# Patient Record
Sex: FEMALE | Race: WHITE | NOT HISPANIC OR LATINO | Employment: OTHER | URBAN - METROPOLITAN AREA
[De-identification: names, ages, dates, MRNs, and addresses within clinical notes are randomized per-mention and may not be internally consistent; named-entity substitution may affect disease eponyms.]

---

## 2017-05-10 ENCOUNTER — APPOINTMENT (OUTPATIENT)
Dept: LAB | Facility: CLINIC | Age: 37
End: 2017-05-10
Payer: COMMERCIAL

## 2017-05-10 ENCOUNTER — TRANSCRIBE ORDERS (OUTPATIENT)
Dept: MRI IMAGING | Facility: CLINIC | Age: 37
End: 2017-05-10

## 2017-05-10 ENCOUNTER — ALLSCRIPTS OFFICE VISIT (OUTPATIENT)
Dept: OTHER | Facility: OTHER | Age: 37
End: 2017-05-10

## 2017-05-10 DIAGNOSIS — J31.0 CHRONIC RHINITIS: ICD-10-CM

## 2017-05-10 DIAGNOSIS — D24.1 BENIGN NEOPLASM OF RIGHT BREAST: ICD-10-CM

## 2017-05-10 PROCEDURE — 87070 CULTURE OTHR SPECIMN AEROBIC: CPT

## 2017-05-13 LAB — BACTERIA THROAT CULT: NORMAL

## 2017-06-16 ENCOUNTER — GENERIC CONVERSION - ENCOUNTER (OUTPATIENT)
Dept: OTHER | Facility: OTHER | Age: 37
End: 2017-06-16

## 2018-01-13 VITALS
HEART RATE: 60 BPM | BODY MASS INDEX: 23.24 KG/M2 | TEMPERATURE: 97.5 F | WEIGHT: 136.13 LBS | DIASTOLIC BLOOD PRESSURE: 78 MMHG | OXYGEN SATURATION: 97 % | SYSTOLIC BLOOD PRESSURE: 104 MMHG | HEIGHT: 64 IN

## 2018-03-22 RX ORDER — VALACYCLOVIR HYDROCHLORIDE 500 MG/1
TABLET, FILM COATED ORAL
Refills: 3 | COMMUNITY
Start: 2018-02-01

## 2018-03-24 ENCOUNTER — OFFICE VISIT (OUTPATIENT)
Dept: INTERNAL MEDICINE CLINIC | Facility: CLINIC | Age: 38
End: 2018-03-24
Payer: COMMERCIAL

## 2018-03-24 VITALS
OXYGEN SATURATION: 98 % | BODY MASS INDEX: 22.88 KG/M2 | WEIGHT: 134 LBS | HEART RATE: 70 BPM | DIASTOLIC BLOOD PRESSURE: 72 MMHG | HEIGHT: 64 IN | SYSTOLIC BLOOD PRESSURE: 100 MMHG

## 2018-03-24 DIAGNOSIS — F41.9 ANXIETY AND DEPRESSION: Primary | ICD-10-CM

## 2018-03-24 DIAGNOSIS — G25.81 RESTLESS LEG: ICD-10-CM

## 2018-03-24 DIAGNOSIS — F32.A ANXIETY AND DEPRESSION: Primary | ICD-10-CM

## 2018-03-24 PROCEDURE — 99214 OFFICE O/P EST MOD 30 MIN: CPT | Performed by: INTERNAL MEDICINE

## 2018-03-24 RX ORDER — PRAMIPEXOLE DIHYDROCHLORIDE 0.25 MG/1
TABLET ORAL
Qty: 30 TABLET | Refills: 5 | Status: SHIPPED | OUTPATIENT
Start: 2018-03-24 | End: 2018-09-16 | Stop reason: SDUPTHER

## 2018-03-24 NOTE — PROGRESS NOTES
Assessment/Plan:       Diagnoses and all orders for this visit:    Anxiety and depression    Restless leg    Other orders  -     valACYclovir (VALTREX) 500 mg tablet; TAKE 1 TABLET (500 MG TOTAL) BY MOUTH 2 (TWO) TIMES A DAY FOR 3 DAYS  Subjective:      Patient ID: Chinmay Dyer is a 30 y o  female  A 74-year-old woman  Complaint of restless leg  This has been intermittent over the years and was significant in college  It disappeared and now it has recurred over the past year  She experiences a need to move her legs she has to get out of bed she has to walk she has to go up and down steps  Associated anxiety symptoms  Especially in I, the patient will experience a sensation of rapid heartbeat, heaviness in the chest, palpitations, and worry  Context is that she is a young mother with 2 small children  1 child is 12month-old  She is listening to the baby monitor waiting for something to happen   Associated low libido-no sex drive at all  No physical complaints regarding sexuality  [place] does not hurt  She recently saw gynecology and has no abnormalities  No relationship issues  Denies abuse  No substance issues  Nonsmoker, no illicit drugs, rare alcohol  Objectively she states that she realizes her life is good  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  There were no financial issues  There are no relationship issues  She is not a victim of abuse  Anxiety   Symptoms include nervous/anxious behavior  Patient reports no dizziness, nausea or shortness of breath  The following portions of the patient's history were reviewed and updated as appropriate:   She has a past medical history of Hair or hair follicle disorder  ,   does not have any pertinent problems on file  ,   has a past surgical history that includes Tooth extraction  ,  family history includes Breast cancer in her family; Colon cancer in her maternal grandfather; Diverticulitis in her father; Ovarian cancer in her other; Ulcerative colitis in her mother  ,   reports that she has never smoked  She has never used smokeless tobacco  She reports that she does not drink alcohol or use drugs  ,  has No Known Allergies     Current Outpatient Prescriptions   Medication Sig Dispense Refill    valACYclovir (VALTREX) 500 mg tablet TAKE 1 TABLET (500 MG TOTAL) BY MOUTH 2 (TWO) TIMES A DAY FOR 3 DAYS  3     No current facility-administered medications for this visit  Review of Systems   Constitutional: Negative for chills and fever  HENT: Negative for sore throat and trouble swallowing  Eyes: Negative for pain  Respiratory: Negative for cough, shortness of breath and wheezing  Gastrointestinal: Negative for abdominal pain, diarrhea, nausea and vomiting  Endocrine: Negative for cold intolerance and heat intolerance  Genitourinary: Negative for dysuria, frequency and pelvic pain  Musculoskeletal: Negative for arthralgias and joint swelling  Skin: Negative for rash and wound  Allergic/Immunologic: Negative for immunocompromised state  Neurological: Positive for tremors  Negative for dizziness, seizures, syncope and headaches  Psychiatric/Behavioral: Positive for dysphoric mood and sleep disturbance  The patient is nervous/anxious  Objective:  Vitals:    03/24/18 1034   BP: 100/72   Pulse: 70   SpO2: 98%      Physical Exam   Constitutional: She is oriented to person, place, and time  She appears well-developed and well-nourished  HENT:   Head: Normocephalic and atraumatic  Eyes: EOM are normal  Pupils are equal, round, and reactive to light  Neck: Normal range of motion  Neck supple  No tracheal deviation present  No thyromegaly present  Cardiovascular: Normal rate, regular rhythm and normal heart sounds  Exam reveals no gallop  No murmur heard  Pulmonary/Chest: No respiratory distress  She has no wheezes  She has no rales     Musculoskeletal: Normal range of motion  She exhibits no tenderness or deformity  Neurological: She is alert and oriented to person, place, and time  Coordination normal    Skin: Skin is warm  Psychiatric: She has a normal mood and affect   Judgment normal

## 2018-03-24 NOTE — PATIENT INSTRUCTIONS
Restless leg with associated insomnia  Recommendation is trial of Mirapex starting at 0   2 5 HS with escalation of dose up to 2 mg as needed  Panic disorder/anxiety/depression  I would prefer to stick with Mirapex and see what happens to the other issues in relationship to sleep disorder

## 2018-08-24 ENCOUNTER — OFFICE VISIT (OUTPATIENT)
Dept: INTERNAL MEDICINE CLINIC | Facility: CLINIC | Age: 38
End: 2018-08-24
Payer: COMMERCIAL

## 2018-08-24 VITALS
SYSTOLIC BLOOD PRESSURE: 110 MMHG | OXYGEN SATURATION: 94 % | WEIGHT: 134.2 LBS | BODY MASS INDEX: 22.91 KG/M2 | HEIGHT: 64 IN | DIASTOLIC BLOOD PRESSURE: 70 MMHG | HEART RATE: 81 BPM

## 2018-08-24 DIAGNOSIS — F32.A ANXIETY AND DEPRESSION: Primary | ICD-10-CM

## 2018-08-24 DIAGNOSIS — F41.9 ANXIETY AND DEPRESSION: Primary | ICD-10-CM

## 2018-08-24 PROCEDURE — 99213 OFFICE O/P EST LOW 20 MIN: CPT | Performed by: INTERNAL MEDICINE

## 2018-08-24 NOTE — PATIENT INSTRUCTIONS
The patient gives a good history of prolonged depression  It has been ongoing since her last child was born about 2 years ago  I do recommend consult again with therapy  Follow up with me in about 6 weeks  Recommendation is therapeutic trial of Zoloft    Started 25 mg daily and try to workup, albeit slowly, to 100

## 2018-08-24 NOTE — PROGRESS NOTES
Assessment/Plan:       Diagnoses and all orders for this visit:    Anxiety and depression          There are no Patient Instructions on file for this visit  Subjective:      Patient ID: Josefina Red is a 41 y o  female  A 41year-old patient presents with a 2 year history of symptoms of depression  She scores 18 on the depression scale     She reports that she has been to therapy in the past although not recently  At the advice of a gynecologist, she has taken upper regime of multivitamins and vitamin B12 and vitamin D3 which has, of the enough, cured night sweats, but she is still depressed   She has taken up bicycling, goes to the gym, she has no physical limitations  She asks if she would benefit from an antidepressant medication  There are no obvious precipitants  There are no life events that 1 might blame  Relationship is good  Children are well  Physical health is good  No financial woesDenies suicidal or homicidal ideation      She does state that she, occasionally, gets a bit of anxiety, but although she has a mixed anxiety depression picture predominant depression is noted  No associated fatigue        The following portions of the patient's history were reviewed and updated as appropriate:   She has a past medical history of Hair or hair follicle disorder  ,   does not have any pertinent problems on file  ,   has a past surgical history that includes Tooth extraction  ,  family history includes Breast cancer in her family; Colon cancer in her maternal grandfather; Diverticulitis in her father; Ovarian cancer in her other; Ulcerative colitis in her mother  ,   reports that she has never smoked  She has never used smokeless tobacco  She reports that she does not drink alcohol or use drugs  ,  has No Known Allergies     Current Outpatient Prescriptions   Medication Sig Dispense Refill    pramipexole (MIRAPEX) 0 25 mg tablet 1 tablet HS   30 tablet 5    valACYclovir (VALTREX) 500 mg tablet TAKE 1 TABLET (500 MG TOTAL) BY MOUTH 2 (TWO) TIMES A DAY FOR 3 DAYS  3     No current facility-administered medications for this visit  Review of Systems   Constitutional: Negative for chills and fever  HENT: Negative for sore throat and trouble swallowing  Eyes: Negative for pain  Respiratory: Negative for cough, shortness of breath and wheezing  Cardiovascular: Negative for chest pain and leg swelling  Gastrointestinal: Negative for abdominal pain, diarrhea, nausea and vomiting  Endocrine: Negative for cold intolerance and heat intolerance  Genitourinary: Negative for dysuria, frequency and pelvic pain  Musculoskeletal: Negative for arthralgias and joint swelling  Skin: Negative for rash and wound  Allergic/Immunologic: Negative for immunocompromised state  Neurological: Negative for dizziness, seizures, syncope and headaches  Psychiatric/Behavioral: Positive for dysphoric mood  The patient is not nervous/anxious  Objective:  Vitals:    08/24/18 1139   BP: 110/70   Pulse: 81   SpO2: 94%      Physical Exam   Constitutional: She appears well-developed and well-nourished  Pulmonary/Chest: Effort normal  No respiratory distress  Musculoskeletal: Normal range of motion  Neurological: She is alert  Psychiatric: She exhibits a depressed mood

## 2018-09-16 DIAGNOSIS — G25.81 RESTLESS LEG: ICD-10-CM

## 2018-09-17 RX ORDER — PRAMIPEXOLE DIHYDROCHLORIDE 0.25 MG/1
TABLET ORAL
Qty: 30 TABLET | Refills: 1 | Status: SHIPPED | OUTPATIENT
Start: 2018-09-17 | End: 2018-09-21 | Stop reason: SDUPTHER

## 2018-09-21 DIAGNOSIS — G25.81 RESTLESS LEG: ICD-10-CM

## 2018-09-21 RX ORDER — PRAMIPEXOLE DIHYDROCHLORIDE 0.25 MG/1
0.25 TABLET ORAL
Qty: 90 TABLET | Refills: 1 | Status: SHIPPED | OUTPATIENT
Start: 2018-09-21 | End: 2019-05-11 | Stop reason: SDUPTHER

## 2018-09-21 NOTE — TELEPHONE ENCOUNTER
Patient called stating that [facility] said she had no more refills on pramipexole (Mirapex) 0 25 mg tablet  Please check and see if pharmacy needs a new script for the medication and if doctor can send in a refill

## 2018-09-24 ENCOUNTER — HOSPITAL ENCOUNTER (OUTPATIENT)
Dept: CT IMAGING | Facility: HOSPITAL | Age: 38
Discharge: HOME | End: 2018-09-24
Attending: INTERNAL MEDICINE
Payer: COMMERCIAL

## 2018-09-24 ENCOUNTER — OFFICE VISIT (OUTPATIENT)
Dept: INTERNAL MEDICINE CLINIC | Facility: CLINIC | Age: 38
End: 2018-09-24
Payer: COMMERCIAL

## 2018-09-24 ENCOUNTER — TELEPHONE (OUTPATIENT)
Dept: INTERNAL MEDICINE CLINIC | Facility: CLINIC | Age: 38
End: 2018-09-24

## 2018-09-24 VITALS
BODY MASS INDEX: 23.05 KG/M2 | RESPIRATION RATE: 14 BRPM | HEIGHT: 64 IN | DIASTOLIC BLOOD PRESSURE: 78 MMHG | WEIGHT: 135 LBS | HEART RATE: 78 BPM | OXYGEN SATURATION: 97 % | SYSTOLIC BLOOD PRESSURE: 110 MMHG

## 2018-09-24 DIAGNOSIS — F41.9 ANXIETY AND DEPRESSION: ICD-10-CM

## 2018-09-24 DIAGNOSIS — R61 NIGHT SWEATS: ICD-10-CM

## 2018-09-24 DIAGNOSIS — G44.52 NEW DAILY PERSISTENT HEADACHE: ICD-10-CM

## 2018-09-24 DIAGNOSIS — F32.A ANXIETY AND DEPRESSION: ICD-10-CM

## 2018-09-24 DIAGNOSIS — G44.52 NEW DAILY PERSISTENT HEADACHE: Primary | ICD-10-CM

## 2018-09-24 PROCEDURE — 3008F BODY MASS INDEX DOCD: CPT | Performed by: INTERNAL MEDICINE

## 2018-09-24 PROCEDURE — 99214 OFFICE O/P EST MOD 30 MIN: CPT | Performed by: INTERNAL MEDICINE

## 2018-09-24 PROCEDURE — 1036F TOBACCO NON-USER: CPT | Performed by: INTERNAL MEDICINE

## 2018-09-24 PROCEDURE — 70496 CT ANGIOGRAPHY HEAD: CPT

## 2018-09-24 RX ADMIN — IOHEXOL 85 ML: 350 INJECTION, SOLUTION INTRAVENOUS at 18:28

## 2018-09-24 NOTE — PATIENT INSTRUCTIONS
A 41year-old with well controlled depression but new onset of left temporal headache of unknown cause  She will need a stat CTA to exclude the admittedly remote possibility of aneurysm  The patient is caring for small children and cannot wait here   She will go home and await our phone call

## 2018-09-24 NOTE — PROGRESS NOTES
Assessment/Plan:       Diagnoses and all orders for this visit:    New daily persistent headache    Anxiety and depression    Night sweats          There are no Patient Instructions on file for this visit  Subjective:      Patient ID: Yamileth Cleary is a 41 y o  female  Follow-up visit for depression and some other new issues    A patient had verbalize severe depressive symptoms  Begun on Zoloft and titrated up to 50 mg  She returns today stating that she feels well and that this is working quite nicely  Headache:  New onset of headache felt in the left temporal region  Description is of a pulsating headache  Onset was 3-4 days ago  Radiation is up to the top of the head  1-2 episodes a day lasting 3-4 minutes  No precipitant 70 no precipitating factors been identified  No lateralizing symptoms but some lightheadedness  One-week onset of night sweats beginning suddenly with no known exacerbating factor  No infectious disease symptoms  She has had these in the past and there was successfully treated with a combination of vitamin-D, vitamin-B, and vitamin C  The following portions of the patient's history were reviewed and updated as appropriate:   She has a past medical history of Hair or hair follicle disorder  ,   does not have any pertinent problems on file  ,   has a past surgical history that includes Tooth extraction  ,  family history includes Breast cancer in her family; Colon cancer in her maternal grandfather; Diverticulitis in her father; Ovarian cancer in her other; Ulcerative colitis in her mother  ,   reports that she has never smoked  She has never used smokeless tobacco  She reports that she does not drink alcohol or use drugs  ,  has No Known Allergies     Current Outpatient Prescriptions   Medication Sig Dispense Refill    pramipexole (MIRAPEX) 0 25 mg tablet Take 1 tablet (0 25 mg total) by mouth daily at bedtime 90 tablet 1    sertraline (ZOLOFT) 50 mg tablet Take 1 tablet (50 mg total) by mouth daily 30 tablet 3    valACYclovir (VALTREX) 500 mg tablet TAKE 1 TABLET (500 MG TOTAL) BY MOUTH 2 (TWO) TIMES A DAY FOR 3 DAYS  3     No current facility-administered medications for this visit  Review of Systems   Constitutional: Positive for diaphoresis  Negative for activity change and fatigue  Neurological: Positive for light-headedness and headaches  Negative for tremors, syncope, weakness and numbness  Psychiatric/Behavioral: Negative for dysphoric mood  The patient is not nervous/anxious  Objective:  Vitals:    09/24/18 1039   BP: 110/78   Pulse: 78   Resp: 14   SpO2: 97%      Physical Exam   Constitutional: She appears well-developed and well-nourished  Pulmonary/Chest: Effort normal  No respiratory distress  Musculoskeletal: Normal range of motion  Neurological: She is alert  Psychiatric: Thought content normal  Her mood appears not anxious  Cognition and memory are normal  She does not exhibit a depressed mood

## 2018-09-25 ENCOUNTER — TELEPHONE (OUTPATIENT)
Dept: INTERNAL MEDICINE CLINIC | Facility: CLINIC | Age: 38
End: 2018-09-25

## 2018-09-25 NOTE — TELEPHONE ENCOUNTER
Pt called back in regards to the RX she has none left  Was in yesterday to see Mario Alberto and there were other issues and forgot about the RX  She did call earlier today  Just following up and  also [facility] [address]

## 2018-09-25 NOTE — TELEPHONE ENCOUNTER
Called pt and informed her of result per Dr Brian Sheffield, pt would like to know if Dr Vazquez Listen be increasing her Zoloft rx? Pt was under the impression that she would be getting a new rx with an increased dosage, FWD to Dr Brian Sheffield

## 2018-10-25 ENCOUNTER — TELEPHONE (OUTPATIENT)
Dept: INTERNAL MEDICINE CLINIC | Facility: CLINIC | Age: 38
End: 2018-10-25

## 2018-10-25 DIAGNOSIS — F41.9 ANXIETY AND DEPRESSION: ICD-10-CM

## 2018-10-25 DIAGNOSIS — F32.A ANXIETY AND DEPRESSION: ICD-10-CM

## 2018-10-25 RX ORDER — SERTRALINE HYDROCHLORIDE 100 MG/1
100 TABLET, FILM COATED ORAL DAILY
Qty: 90 TABLET | Refills: 3 | Status: SHIPPED | OUTPATIENT
Start: 2018-10-25 | End: 2019-11-28 | Stop reason: SDUPTHER

## 2018-10-25 NOTE — TELEPHONE ENCOUNTER
Pt has been taking zoloft 50mg for the past 2 months  Feeling like she is back to where she was, maybe her body is getting used to it    Would like to either increase medication or change medications, please advise

## 2018-12-27 ENCOUNTER — OFFICE VISIT (OUTPATIENT)
Dept: INTERNAL MEDICINE CLINIC | Facility: CLINIC | Age: 38
End: 2018-12-27
Payer: COMMERCIAL

## 2018-12-27 ENCOUNTER — APPOINTMENT (OUTPATIENT)
Dept: LAB | Facility: CLINIC | Age: 38
End: 2018-12-27
Payer: COMMERCIAL

## 2018-12-27 VITALS
DIASTOLIC BLOOD PRESSURE: 80 MMHG | BODY MASS INDEX: 21.28 KG/M2 | OXYGEN SATURATION: 97 % | SYSTOLIC BLOOD PRESSURE: 130 MMHG | HEART RATE: 78 BPM | HEIGHT: 66 IN | WEIGHT: 132.4 LBS

## 2018-12-27 DIAGNOSIS — Z11.1 TUBERCULOSIS SCREENING: ICD-10-CM

## 2018-12-27 DIAGNOSIS — Z00.00 HEALTH CARE MAINTENANCE: ICD-10-CM

## 2018-12-27 DIAGNOSIS — Z00.00 HEALTH CARE MAINTENANCE: Primary | ICD-10-CM

## 2018-12-27 DIAGNOSIS — T75.3XXA SEA SICKNESS, INITIAL ENCOUNTER: Primary | ICD-10-CM

## 2018-12-27 PROBLEM — G44.52 NEW DAILY PERSISTENT HEADACHE: Status: RESOLVED | Noted: 2018-09-24 | Resolved: 2018-12-27

## 2018-12-27 PROBLEM — G25.81 RESTLESS LEG: Status: RESOLVED | Noted: 2018-03-24 | Resolved: 2018-12-27

## 2018-12-27 PROBLEM — R61 NIGHT SWEATS: Status: RESOLVED | Noted: 2018-09-24 | Resolved: 2018-12-27

## 2018-12-27 PROCEDURE — 99395 PREV VISIT EST AGE 18-39: CPT | Performed by: INTERNAL MEDICINE

## 2018-12-27 PROCEDURE — 86480 TB TEST CELL IMMUN MEASURE: CPT

## 2018-12-27 PROCEDURE — 36415 COLL VENOUS BLD VENIPUNCTURE: CPT

## 2018-12-27 RX ORDER — SCOLOPAMINE TRANSDERMAL SYSTEM 1 MG/1
1 PATCH, EXTENDED RELEASE TRANSDERMAL
Qty: 3 PATCH | Refills: 0 | Status: SHIPPED | OUTPATIENT
Start: 2018-12-27 | End: 2019-02-06

## 2018-12-27 NOTE — PROGRESS NOTES
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   Treated for anxious depression with Zoloft 100 which is working well  Completely asymptomatic  The following portions of the patient's history were reviewed and updated as appropriate:   She has a past medical history of Hair or hair follicle disorder  ,   does not have any pertinent problems on file  ,   has a past surgical history that includes Tooth extraction  ,  family history includes Breast cancer in her family; Colon cancer in her maternal grandfather; Diverticulitis in her father; Ovarian cancer in her other; Ulcerative colitis in her mother  ,   reports that she has never smoked  She has never used smokeless tobacco  She reports that she does not drink alcohol or use drugs  ,  has No Known Allergies     Current Outpatient Prescriptions   Medication Sig Dispense Refill    pramipexole (MIRAPEX) 0 25 mg tablet Take 1 tablet (0 25 mg total) by mouth daily at bedtime 90 tablet 1    sertraline (ZOLOFT) 100 mg tablet Take 1 tablet (100 mg total) by mouth daily 90 tablet 3    valACYclovir (VALTREX) 500 mg tablet TAKE 1 TABLET (500 MG TOTAL) BY MOUTH 2 (TWO) TIMES A DAY FOR 3 DAYS  3     No current facility-administered medications for this visit  Review of Systems   Constitutional: Negative for chills and fever  HENT: Negative for sore throat and trouble swallowing  Eyes: Negative for pain  Respiratory: Negative for cough, shortness of breath and wheezing  Cardiovascular: Negative for chest pain and leg swelling  Gastrointestinal: Negative for abdominal pain, diarrhea, nausea and vomiting  Endocrine: Negative for cold intolerance and heat intolerance  Genitourinary: Negative for dysuria, frequency and pelvic pain  Musculoskeletal: Negative for arthralgias and joint swelling  Skin: Negative for rash and wound  Allergic/Immunologic: Negative for immunocompromised state  Neurological: Negative for dizziness, seizures, syncope and headaches  Psychiatric/Behavioral: Negative for dysphoric mood  The patient is not nervous/anxious  Objective:  Vitals:    12/27/18 0908   BP: 130/80   Pulse: 78   SpO2: 97%      Physical Exam   Constitutional: She is oriented to person, place, and time  She appears well-developed and well-nourished  HENT:   Head: Normocephalic and atraumatic  Eyes: Pupils are equal, round, and reactive to light  EOM are normal    Neck: Normal range of motion  Neck supple  No tracheal deviation present  No thyromegaly present  Cardiovascular: Normal rate, regular rhythm and normal heart sounds  Exam reveals no gallop  No murmur heard  Pulmonary/Chest: No respiratory distress  She has no wheezes  She has no rales  Abdominal: Soft  Bowel sounds are normal  There is no tenderness  Musculoskeletal: Normal range of motion  She exhibits no tenderness or deformity  Neurological: She is alert and oriented to person, place, and time  Coordination normal    Skin: Skin is warm  Psychiatric: She has a normal mood and affect   Judgment normal

## 2018-12-28 LAB
GAMMA INTERFERON BACKGROUND BLD IA-ACNC: 0.05 IU/ML
M TB IFN-G BLD-IMP: NEGATIVE
M TB IFN-G CD4+ BCKGRND COR BLD-ACNC: 0 IU/ML
M TB IFN-G CD4+ BCKGRND COR BLD-ACNC: 0.01 IU/ML
MITOGEN IGNF BCKGRD COR BLD-ACNC: >10 IU/ML

## 2019-01-02 ENCOUNTER — TELEPHONE (OUTPATIENT)
Dept: INTERNAL MEDICINE CLINIC | Facility: CLINIC | Age: 39
End: 2019-01-02

## 2019-01-02 NOTE — TELEPHONE ENCOUNTER
Pt dropped off school physical form, needs to be filled out  Pt would like to  today if at all possible, call back upon completion, put physical form on robbie's desk

## 2019-01-03 ENCOUNTER — TELEPHONE (OUTPATIENT)
Dept: INTERNAL MEDICINE CLINIC | Facility: CLINIC | Age: 39
End: 2019-01-03

## 2019-01-03 NOTE — TELEPHONE ENCOUNTER
PATIENT CALLED REQUESTING THE PHYSICAL FORMS THAT SHE DROPPED OFF YESTERDAY  SHE NEEDS THEM TO BE FILLED OUT ASAP  SHE CANT START HER NEW JOB WITHOUT THEM

## 2019-01-07 ENCOUNTER — TELEPHONE (OUTPATIENT)
Dept: INTERNAL MEDICINE CLINIC | Facility: CLINIC | Age: 39
End: 2019-01-07

## 2019-01-07 NOTE — TELEPHONE ENCOUNTER
Patient called- She was in for a physical with Dr Nicolas Regan on 12/27 and the backside of her form wasn't filled out  Her employer needs it today or she will not be allowed to continue to work  She's going to come by today and drop it off and wait for it  She doesn't have any other choice- unfortunately        #[phone]

## 2019-02-06 ENCOUNTER — OFFICE VISIT (OUTPATIENT)
Dept: INTERNAL MEDICINE CLINIC | Facility: CLINIC | Age: 39
End: 2019-02-06
Payer: COMMERCIAL

## 2019-02-06 ENCOUNTER — OFFICE VISIT (OUTPATIENT)
Dept: GASTROENTEROLOGY | Facility: CLINIC | Age: 39
End: 2019-02-06
Payer: COMMERCIAL

## 2019-02-06 VITALS
SYSTOLIC BLOOD PRESSURE: 102 MMHG | OXYGEN SATURATION: 98 % | HEART RATE: 70 BPM | DIASTOLIC BLOOD PRESSURE: 72 MMHG | BODY MASS INDEX: 21.63 KG/M2 | WEIGHT: 132 LBS

## 2019-02-06 VITALS
DIASTOLIC BLOOD PRESSURE: 70 MMHG | HEIGHT: 66 IN | BODY MASS INDEX: 21.05 KG/M2 | WEIGHT: 131 LBS | SYSTOLIC BLOOD PRESSURE: 115 MMHG | HEART RATE: 75 BPM

## 2019-02-06 DIAGNOSIS — R10.84 GENERALIZED ABDOMINAL PAIN: ICD-10-CM

## 2019-02-06 DIAGNOSIS — K76.9 HEPATIC LESION: Primary | ICD-10-CM

## 2019-02-06 DIAGNOSIS — R14.0 BLOATING: Primary | ICD-10-CM

## 2019-02-06 DIAGNOSIS — K58.8 OTHER IRRITABLE BOWEL SYNDROME: ICD-10-CM

## 2019-02-06 PROCEDURE — 1036F TOBACCO NON-USER: CPT | Performed by: INTERNAL MEDICINE

## 2019-02-06 PROCEDURE — 99214 OFFICE O/P EST MOD 30 MIN: CPT | Performed by: NURSE PRACTITIONER

## 2019-02-06 PROCEDURE — 99213 OFFICE O/P EST LOW 20 MIN: CPT | Performed by: INTERNAL MEDICINE

## 2019-02-06 RX ORDER — DICYCLOMINE HCL 20 MG
20 TABLET ORAL EVERY 6 HOURS
Qty: 120 TABLET | Refills: 6 | Status: SHIPPED | OUTPATIENT
Start: 2019-02-06 | End: 2019-03-08

## 2019-02-06 NOTE — PROGRESS NOTES
Assessment/Plan:    IBS with mild abdominal pain and bloating- trial of dicyclomine, continue with diet and probiotics  Appendectomy on 1/25- saw surgeon doing well       Diagnoses and all orders for this visit:    Bloating  -     dicyclomine (BENTYL) 20 mg tablet; Take 1 tablet (20 mg total) by mouth every 6 (six) hours for 30 days    Other irritable bowel syndrome  -     dicyclomine (BENTYL) 20 mg tablet; Take 1 tablet (20 mg total) by mouth every 6 (six) hours for 30 days    Generalized abdominal pain  -     dicyclomine (BENTYL) 20 mg tablet; Take 1 tablet (20 mg total) by mouth every 6 (six) hours for 30 days            Subjective:      Patient ID: Emile Darling is a 41 y o  female  73-year-old female with history of two colonoscopies that were completely normal and diagnosis of IBS with constipation  Presently she is having two bowel movements a day without melena or hematochezia no rectal pain  She has a good appetite and overall feels well except for persistent abdominal bloating and abdominal pain relieved by gas  She had appendectomy on January 25th and is doing well surgically  She has never tried dicyclomine but she does take probiotics, exercises, has a healthy diet  We will give her a trial of dicyclomine and she will call in one month        The following portions of the patient's history were reviewed and updated as appropriate: She  has a past medical history of Hair or hair follicle disorder  She   Patient Active Problem List    Diagnosis Date Noted    Other irritable bowel syndrome 02/06/2019    Bloating 02/06/2019    Generalized abdominal pain 02/06/2019    Health care maintenance 12/27/2018    Tuberculosis screening 12/27/2018    Anxiety and depression 03/24/2018     She  has a past surgical history that includes Tooth extraction    Her family history includes Breast cancer in her family; Colon cancer in her maternal grandfather; Diverticulitis in her father; Ovarian cancer in her other; Ulcerative colitis in her mother  She  reports that she has never smoked  She has never used smokeless tobacco  She reports that she does not drink alcohol or use drugs  Current Outpatient Prescriptions   Medication Sig Dispense Refill    pramipexole (MIRAPEX) 0 25 mg tablet Take 1 tablet (0 25 mg total) by mouth daily at bedtime 90 tablet 1    sertraline (ZOLOFT) 100 mg tablet Take 1 tablet (100 mg total) by mouth daily 90 tablet 3    valACYclovir (VALTREX) 500 mg tablet TAKE 1 TABLET (500 MG TOTAL) BY MOUTH 2 (TWO) TIMES A DAY FOR 3 DAYS  3    dicyclomine (BENTYL) 20 mg tablet Take 1 tablet (20 mg total) by mouth every 6 (six) hours for 30 days 120 tablet 6     No current facility-administered medications for this visit  Current Outpatient Prescriptions on File Prior to Visit   Medication Sig    pramipexole (MIRAPEX) 0 25 mg tablet Take 1 tablet (0 25 mg total) by mouth daily at bedtime    sertraline (ZOLOFT) 100 mg tablet Take 1 tablet (100 mg total) by mouth daily    valACYclovir (VALTREX) 500 mg tablet TAKE 1 TABLET (500 MG TOTAL) BY MOUTH 2 (TWO) TIMES A DAY FOR 3 DAYS   [DISCONTINUED] scopolamine (TRANSDERM-SCOP) 1 5 mg/3 days TD 72 hr patch Place 1 patch on the skin every third day     No current facility-administered medications on file prior to visit  She has No Known Allergies       Review of Systems   Constitutional: Negative for appetite change, fatigue and unexpected weight change  HENT: Negative  Respiratory: Negative  Cardiovascular: Negative  Gastrointestinal: Positive for abdominal distention and abdominal pain  Skin: Negative  Psychiatric/Behavioral: Negative  Objective:      /70   Pulse 75   Ht 5' 5 5" (1 664 m)   Wt 59 4 kg (131 lb)   BMI 21 47 kg/m²          Physical Exam   Constitutional: She appears well-developed and well-nourished  Cardiovascular: Normal rate and regular rhythm      Pulmonary/Chest: Effort normal and breath sounds normal    Abdominal: Soft  Bowel sounds are normal  There is no tenderness

## 2019-02-08 NOTE — PATIENT INSTRUCTIONS
Incidental finding of some kind of hepatic lesion on CT needs follow-up MRI  The importance of this emphasized the patient  She agreed to this plan

## 2019-02-08 NOTE — PROGRESS NOTES
Assessment/Plan:       Diagnoses and all orders for this visit:    Hepatic lesion  -     MRI abdomen w wo contrast; Future          Patient Instructions   Incidental finding of some kind of hepatic lesion on CT needs follow-up MRI  The importance of this emphasized the patient  She agreed to this plan  Subjective:      Patient ID: Emerson Lobo is a 41 y o  female  Brought in for a RANJIT visit after an appendectomy  She feels well  However, I reviewed the data from the hospitalization and noted that a CT scan reported some kind of lesion in the upper lobe of the liver and recommended an MRI  The patient was not informed of this during the hospitalization  No symptoms referable to this finding        The following portions of the patient's history were reviewed and updated as appropriate:   She has a past medical history of Hair or hair follicle disorder  ,   does not have any pertinent problems on file  ,   has a past surgical history that includes Tooth extraction and Appendectomy  ,  family history includes Breast cancer in her family; Colon cancer in her maternal grandfather; Diverticulitis in her father; Ovarian cancer in her other; Ulcerative colitis in her mother  ,   reports that she has never smoked  She has never used smokeless tobacco  She reports that she does not drink alcohol or use drugs  ,  has No Known Allergies     Current Outpatient Prescriptions   Medication Sig Dispense Refill    dicyclomine (BENTYL) 20 mg tablet Take 1 tablet (20 mg total) by mouth every 6 (six) hours for 30 days 120 tablet 6    pramipexole (MIRAPEX) 0 25 mg tablet Take 1 tablet (0 25 mg total) by mouth daily at bedtime 90 tablet 1    sertraline (ZOLOFT) 100 mg tablet Take 1 tablet (100 mg total) by mouth daily 90 tablet 3    valACYclovir (VALTREX) 500 mg tablet TAKE 1 TABLET (500 MG TOTAL) BY MOUTH 2 (TWO) TIMES A DAY FOR 3 DAYS  3     No current facility-administered medications for this visit          Review of Systems   Constitutional: Negative for chills and fever  HENT: Negative for sore throat and trouble swallowing  Eyes: Negative for pain  Respiratory: Negative for cough, shortness of breath and wheezing  Cardiovascular: Negative for chest pain and leg swelling  Gastrointestinal: Negative for abdominal pain, diarrhea, nausea and vomiting  Endocrine: Negative for cold intolerance and heat intolerance  Genitourinary: Negative for dysuria, frequency and pelvic pain  Musculoskeletal: Negative for arthralgias and joint swelling  Skin: Negative for rash and wound  Allergic/Immunologic: Negative for immunocompromised state  Neurological: Negative for dizziness, seizures, syncope and headaches  Psychiatric/Behavioral: Negative for dysphoric mood  The patient is not nervous/anxious  Objective:  Vitals:    02/06/19 1801   BP: 102/72   Pulse: 70   SpO2: 98%      Physical Exam   Constitutional:   A female patient who looks well   Pulmonary/Chest: Effort normal    Abdominal: Soft  Neurological: She is alert  Skin:   Healed surgical scars   Psychiatric: She has a normal mood and affect   Judgment normal

## 2019-03-06 ENCOUNTER — TRANSCRIBE ORDERS (OUTPATIENT)
Dept: RADIOLOGY | Facility: CLINIC | Age: 39
End: 2019-03-06

## 2019-03-08 ENCOUNTER — HOSPITAL ENCOUNTER (OUTPATIENT)
Dept: MRI IMAGING | Facility: CLINIC | Age: 39
Discharge: HOME | End: 2019-03-08
Payer: COMMERCIAL

## 2019-03-08 DIAGNOSIS — K76.9 HEPATIC LESION: ICD-10-CM

## 2019-03-08 PROCEDURE — 74183 MRI ABD W/O CNTR FLWD CNTR: CPT

## 2019-03-08 PROCEDURE — 76377 3D RENDER W/INTRP POSTPROCES: CPT

## 2019-03-08 PROCEDURE — A9585 GADOBUTROL INJECTION: HCPCS | Performed by: INTERNAL MEDICINE

## 2019-03-08 RX ADMIN — GADOBUTROL 6 ML: 604.72 INJECTION INTRAVENOUS at 10:56

## 2019-03-12 ENCOUNTER — TELEPHONE (OUTPATIENT)
Dept: INTERNAL MEDICINE CLINIC | Facility: CLINIC | Age: 39
End: 2019-03-12

## 2019-03-12 NOTE — TELEPHONE ENCOUNTER
----- Message from Tawanda Flood MD sent at 3/12/2019  7:58 AM EDT -----  Please call the patient regarding her MRI  Normal MRI  2 hemangiomas in the liver    These are tangled groups of blood vessels and benign

## 2019-05-11 DIAGNOSIS — G25.81 RESTLESS LEG: ICD-10-CM

## 2019-05-13 RX ORDER — PRAMIPEXOLE DIHYDROCHLORIDE 0.25 MG/1
0.25 TABLET ORAL
Qty: 90 TABLET | Refills: 1 | Status: SHIPPED | OUTPATIENT
Start: 2019-05-13 | End: 2019-11-10 | Stop reason: SDUPTHER

## 2019-09-12 ENCOUNTER — TELEPHONE (OUTPATIENT)
Dept: INTERNAL MEDICINE CLINIC | Facility: CLINIC | Age: 39
End: 2019-09-12

## 2019-09-12 DIAGNOSIS — Z00.00 HEALTHCARE MAINTENANCE: Primary | ICD-10-CM

## 2019-09-12 NOTE — TELEPHONE ENCOUNTER
Pt called wants to get a TB shot for work  Pt does not have any paperwork to bring in  Pt was Last seen 02/06 with Dr Sonya Montero for a TCM appt        PT Skyler Rolon

## 2019-09-18 ENCOUNTER — CLINICAL SUPPORT (OUTPATIENT)
Dept: INTERNAL MEDICINE CLINIC | Facility: CLINIC | Age: 39
End: 2019-09-18
Payer: COMMERCIAL

## 2019-09-18 DIAGNOSIS — Z23 ENCOUNTER FOR IMMUNIZATION: Primary | ICD-10-CM

## 2019-09-18 PROCEDURE — 90686 IIV4 VACC NO PRSV 0.5 ML IM: CPT

## 2019-09-18 PROCEDURE — 90471 IMMUNIZATION ADMIN: CPT

## 2019-09-18 PROCEDURE — 86580 TB INTRADERMAL TEST: CPT

## 2019-09-20 LAB
INDURATION: 0 MM
TB SKIN TEST: NEGATIVE

## 2019-11-10 DIAGNOSIS — G25.81 RESTLESS LEG: ICD-10-CM

## 2019-11-11 RX ORDER — PRAMIPEXOLE DIHYDROCHLORIDE 0.25 MG/1
0.25 TABLET ORAL
Qty: 90 TABLET | Refills: 1 | Status: SHIPPED | OUTPATIENT
Start: 2019-11-11 | End: 2020-05-06 | Stop reason: SDUPTHER

## 2019-11-28 DIAGNOSIS — F32.A ANXIETY AND DEPRESSION: ICD-10-CM

## 2019-11-28 DIAGNOSIS — F41.9 ANXIETY AND DEPRESSION: ICD-10-CM

## 2019-11-29 RX ORDER — SERTRALINE HYDROCHLORIDE 100 MG/1
TABLET, FILM COATED ORAL
Qty: 90 TABLET | Refills: 3 | Status: SHIPPED | OUTPATIENT
Start: 2019-11-29 | End: 2021-02-03

## 2020-05-05 DIAGNOSIS — G25.81 RESTLESS LEG: ICD-10-CM

## 2020-05-05 RX ORDER — PRAMIPEXOLE DIHYDROCHLORIDE 0.25 MG/1
0.25 TABLET ORAL
Qty: 90 TABLET | Refills: 1 | OUTPATIENT
Start: 2020-05-05

## 2020-05-06 DIAGNOSIS — G25.81 RESTLESS LEG: ICD-10-CM

## 2020-05-06 RX ORDER — PRAMIPEXOLE DIHYDROCHLORIDE 0.25 MG/1
0.25 TABLET ORAL
Qty: 90 TABLET | Refills: 1 | Status: SHIPPED | OUTPATIENT
Start: 2020-05-06 | End: 2020-11-09

## 2020-05-18 DIAGNOSIS — F32.A ANXIETY AND DEPRESSION: Primary | ICD-10-CM

## 2020-05-18 DIAGNOSIS — F41.9 ANXIETY AND DEPRESSION: Primary | ICD-10-CM

## 2020-05-18 RX ORDER — BUPROPION HYDROCHLORIDE 75 MG/1
75 TABLET ORAL 2 TIMES DAILY
Qty: 100 TABLET | Refills: 3 | Status: SHIPPED | OUTPATIENT
Start: 2020-05-18 | End: 2021-05-03

## 2020-07-21 ENCOUNTER — TELEPHONE (OUTPATIENT)
Dept: ADMINISTRATIVE | Facility: OTHER | Age: 40
End: 2020-07-21

## 2020-07-21 ENCOUNTER — TELEPHONE (OUTPATIENT)
Dept: OTHER | Facility: OTHER | Age: 40
End: 2020-07-21

## 2020-07-21 NOTE — TELEPHONE ENCOUNTER
----- Message from Vikash Logan sent at 7/21/2020  1:57 PM EDT -----  Regarding: pap  Contact: [phone]  07/21/20 1:58 PM    Hello, our patient Chad Bansal has had Pap Smear (HPV) aka Cervical Cancer Screening completed/performed  Please assist in updating the patient chart by pulling the document from Lab Tab within Chart Review  The date of service is 1/9/2020       Thank you,  LADONNA Logan 66

## 2020-07-22 NOTE — TELEPHONE ENCOUNTER
7/22/20 9:00 AM 30 Freddy Mai MD [[id number]] MED AS MON [[id number]] [facility] PHYSICAL     Patient called to cancel appointment and will call the office to reschedule

## 2020-11-09 DIAGNOSIS — G25.81 RESTLESS LEG: ICD-10-CM

## 2020-11-09 RX ORDER — PRAMIPEXOLE DIHYDROCHLORIDE 0.25 MG/1
TABLET ORAL
Qty: 90 TABLET | Refills: 1 | Status: SHIPPED | OUTPATIENT
Start: 2020-11-09 | End: 2021-05-15

## 2021-02-02 DIAGNOSIS — F32.A ANXIETY AND DEPRESSION: ICD-10-CM

## 2021-02-02 DIAGNOSIS — F41.9 ANXIETY AND DEPRESSION: ICD-10-CM

## 2021-02-03 RX ORDER — SERTRALINE HYDROCHLORIDE 100 MG/1
TABLET, FILM COATED ORAL
Qty: 90 TABLET | Refills: 3 | Status: SHIPPED | OUTPATIENT
Start: 2021-02-03

## 2021-04-13 DIAGNOSIS — Z23 ENCOUNTER FOR IMMUNIZATION: ICD-10-CM

## 2021-05-03 ENCOUNTER — OFFICE VISIT (OUTPATIENT)
Dept: INTERNAL MEDICINE CLINIC | Facility: CLINIC | Age: 41
End: 2021-05-03
Payer: COMMERCIAL

## 2021-05-03 VITALS
WEIGHT: 143 LBS | BODY MASS INDEX: 23.8 KG/M2 | OXYGEN SATURATION: 98 % | SYSTOLIC BLOOD PRESSURE: 112 MMHG | DIASTOLIC BLOOD PRESSURE: 70 MMHG | TEMPERATURE: 97.7 F | HEART RATE: 65 BPM

## 2021-05-03 DIAGNOSIS — R09.82 POST-NASAL DRAINAGE: ICD-10-CM

## 2021-05-03 DIAGNOSIS — R44.8 OTHER SYMPTOMS AND SIGNS INVOLVING GENERAL SENSATIONS AND PERCEPTIONS: ICD-10-CM

## 2021-05-03 DIAGNOSIS — R09.89 GLOBUS SENSATION: Primary | ICD-10-CM

## 2021-05-03 PROBLEM — R09.A2 GLOBUS SENSATION: Status: ACTIVE | Noted: 2021-05-03

## 2021-05-03 PROCEDURE — 3725F SCREEN DEPRESSION PERFORMED: CPT | Performed by: INTERNAL MEDICINE

## 2021-05-03 PROCEDURE — 99214 OFFICE O/P EST MOD 30 MIN: CPT | Performed by: INTERNAL MEDICINE

## 2021-05-03 PROCEDURE — 1036F TOBACCO NON-USER: CPT | Performed by: INTERNAL MEDICINE

## 2021-05-03 NOTE — PROGRESS NOTES
Assessment/Plan:       Diagnoses and all orders for this visit:    Globus sensation  -     US thyroid; Future  -     XR sinuses routine 3+ views; Future  -     Ambulatory Referral to Otolaryngology; Future    Post-nasal drainage  -     US thyroid; Future  -     XR sinuses routine 3+ views; Future  -     Ambulatory Referral to Otolaryngology; Future                Subjective:      Patient ID: Hannah Barrett is a 47 y o  female  72-year-old with globus sensation  Although this is been only been going on for about 1 week, she has had this kind of stuffy nose and scratchy throat thing going on for the past 4-5 months  So, the most likely diagnosis is chronic allergic postnasal drip  Exam is benign   Will send her for x-ray of sinuses, thyroid ultrasound, and ENT consultation  The following portions of the patient's history were reviewed and updated as appropriate:   She has a past medical history of Hair or hair follicle disorder  ,  does not have any pertinent problems on file  ,   has a past surgical history that includes Tooth extraction and Appendectomy  ,  family history includes Breast cancer in her family; Colon cancer in her maternal grandfather; Diverticulitis in her father; Ovarian cancer in her other; Ulcerative colitis in her mother  ,   reports that she has never smoked  She has never used smokeless tobacco  She reports that she does not drink alcohol or use drugs  ,  has No Known Allergies     Current Outpatient Medications   Medication Sig Dispense Refill    pramipexole (MIRAPEX) 0 25 mg tablet TAKE 1 TABLET BY MOUTH DAILY AT BEDTIME 90 tablet 1    sertraline (ZOLOFT) 100 mg tablet TAKE 1 TABLET BY MOUTH EVERY DAY 90 tablet 3    valACYclovir (VALTREX) 500 mg tablet TAKE 1 TABLET (500 MG TOTAL) BY MOUTH 2 (TWO) TIMES A DAY FOR 3 DAYS    3    dicyclomine (BENTYL) 20 mg tablet Take 1 tablet (20 mg total) by mouth every 6 (six) hours for 30 days 120 tablet 6     No current facility-administered medications for this visit  Review of Systems   HENT: Positive for congestion  Globus sensation without dysphagia and without odynophagia   All other systems reviewed and are negative  Objective:  Vitals:    05/03/21 1626   BP: 112/70   Pulse: 65   Temp: 97 7 °F (36 5 °C)   SpO2: 98%      Physical Exam  Constitutional:       Appearance: Normal appearance  HENT:      Mouth/Throat:      Mouth: Mucous membranes are moist       Pharynx: Oropharynx is clear  No oropharyngeal exudate or posterior oropharyngeal erythema  Neck:      Musculoskeletal: Normal range of motion  Cardiovascular:      Rate and Rhythm: Normal rate and regular rhythm  Pulmonary:      Effort: Pulmonary effort is normal       Breath sounds: Normal breath sounds  Lymphadenopathy:      Cervical: No cervical adenopathy  Neurological:      Mental Status: She is alert  There are no Patient Instructions on file for this visit

## 2021-05-04 ENCOUNTER — HOSPITAL ENCOUNTER (OUTPATIENT)
Dept: ULTRASOUND IMAGING | Facility: CLINIC | Age: 41
Discharge: HOME | End: 2021-05-04
Payer: COMMERCIAL

## 2021-05-04 DIAGNOSIS — R09.82 POST-NASAL DRAINAGE: ICD-10-CM

## 2021-05-04 DIAGNOSIS — R09.89 GLOBUS SENSATION: ICD-10-CM

## 2021-05-04 PROCEDURE — 76536 US EXAM OF HEAD AND NECK: CPT

## 2021-05-07 ENCOUNTER — TELEPHONE (OUTPATIENT)
Dept: INTERNAL MEDICINE CLINIC | Facility: CLINIC | Age: 41
End: 2021-05-07

## 2021-05-15 DIAGNOSIS — G25.81 RESTLESS LEG: ICD-10-CM

## 2021-05-15 RX ORDER — PRAMIPEXOLE DIHYDROCHLORIDE 0.25 MG/1
TABLET ORAL
Qty: 90 TABLET | Refills: 1 | Status: SHIPPED | OUTPATIENT
Start: 2021-05-15 | End: 2021-08-18 | Stop reason: SDUPTHER

## 2021-05-18 DIAGNOSIS — Z00.00 HEALTHCARE MAINTENANCE: Primary | ICD-10-CM

## 2021-06-01 ENCOUNTER — TELEPHONE (OUTPATIENT)
Dept: INTERNAL MEDICINE CLINIC | Facility: CLINIC | Age: 41
End: 2021-06-01

## 2021-06-01 NOTE — TELEPHONE ENCOUNTER
Going away at the end of the wk, needs a motion sickness patch    Call back # [phone]    [facility] [address] # [phone]

## 2021-06-02 DIAGNOSIS — T75.3XXA MOTION SICKNESS, INITIAL ENCOUNTER: Primary | ICD-10-CM

## 2021-06-02 PROCEDURE — U0005 INFEC AGEN DETEC AMPLI PROBE: HCPCS | Performed by: INTERNAL MEDICINE

## 2021-06-02 PROCEDURE — U0003 INFECTIOUS AGENT DETECTION BY NUCLEIC ACID (DNA OR RNA); SEVERE ACUTE RESPIRATORY SYNDROME CORONAVIRUS 2 (SARS-COV-2) (CORONAVIRUS DISEASE [COVID-19]), AMPLIFIED PROBE TECHNIQUE, MAKING USE OF HIGH THROUGHPUT TECHNOLOGIES AS DESCRIBED BY CMS-2020-01-R: HCPCS | Performed by: INTERNAL MEDICINE

## 2021-06-02 RX ORDER — SCOLOPAMINE TRANSDERMAL SYSTEM 1 MG/1
1 PATCH, EXTENDED RELEASE TRANSDERMAL
Qty: 7 PATCH | Refills: 0 | Status: SHIPPED | OUTPATIENT
Start: 2021-06-02

## 2021-06-03 LAB — SARS-COV-2 RNA RESP QL NAA+PROBE: NEGATIVE

## 2021-08-18 DIAGNOSIS — G25.81 RESTLESS LEG: ICD-10-CM

## 2021-08-18 RX ORDER — PRAMIPEXOLE DIHYDROCHLORIDE 0.25 MG/1
0.25 TABLET ORAL
Qty: 90 TABLET | Refills: 0 | Status: SHIPPED | OUTPATIENT
Start: 2021-08-18 | End: 2022-02-06

## 2021-12-14 ENCOUNTER — IMMUNIZATIONS (OUTPATIENT)
Dept: FAMILY MEDICINE CLINIC | Facility: HOSPITAL | Age: 41
End: 2021-12-14

## 2021-12-14 DIAGNOSIS — Z23 ENCOUNTER FOR IMMUNIZATION: Primary | ICD-10-CM

## 2021-12-14 PROCEDURE — 91300 COVID-19 PFIZER VACC 0.3 ML: CPT

## 2021-12-14 PROCEDURE — 0001A COVID-19 PFIZER VACC 0.3 ML: CPT

## 2022-02-06 DIAGNOSIS — G25.81 RESTLESS LEG: ICD-10-CM

## 2022-02-06 RX ORDER — PRAMIPEXOLE DIHYDROCHLORIDE 0.25 MG/1
0.25 TABLET ORAL
Qty: 90 TABLET | Refills: 0 | Status: SHIPPED | OUTPATIENT
Start: 2022-02-06 | End: 2022-05-09 | Stop reason: SDUPTHER

## 2022-05-09 DIAGNOSIS — G25.81 RESTLESS LEG: ICD-10-CM

## 2022-05-10 RX ORDER — PRAMIPEXOLE DIHYDROCHLORIDE 0.25 MG/1
0.25 TABLET ORAL
Qty: 90 TABLET | Refills: 0 | Status: SHIPPED | OUTPATIENT
Start: 2022-05-10 | End: 2022-08-09

## 2022-08-05 DIAGNOSIS — G25.81 RESTLESS LEG: ICD-10-CM

## 2022-08-09 RX ORDER — PRAMIPEXOLE DIHYDROCHLORIDE 0.25 MG/1
0.25 TABLET ORAL
Qty: 90 TABLET | Refills: 0 | Status: SHIPPED | OUTPATIENT
Start: 2022-08-09 | End: 2022-10-30

## 2022-10-13 ENCOUNTER — TELEPHONE (OUTPATIENT)
Dept: INTERNAL MEDICINE CLINIC | Facility: CLINIC | Age: 42
End: 2022-10-13

## 2022-10-13 ENCOUNTER — OFFICE VISIT (OUTPATIENT)
Dept: INTERNAL MEDICINE CLINIC | Facility: CLINIC | Age: 42
End: 2022-10-13
Payer: COMMERCIAL

## 2022-10-13 VITALS
TEMPERATURE: 97.6 F | HEIGHT: 65 IN | OXYGEN SATURATION: 98 % | SYSTOLIC BLOOD PRESSURE: 102 MMHG | BODY MASS INDEX: 24.72 KG/M2 | RESPIRATION RATE: 18 BRPM | WEIGHT: 148.4 LBS | DIASTOLIC BLOOD PRESSURE: 64 MMHG | HEART RATE: 100 BPM

## 2022-10-13 DIAGNOSIS — B80 PINWORMS: Primary | ICD-10-CM

## 2022-10-13 DIAGNOSIS — Z23 ENCOUNTER FOR IMMUNIZATION: ICD-10-CM

## 2022-10-13 PROCEDURE — 90686 IIV4 VACC NO PRSV 0.5 ML IM: CPT | Performed by: FAMILY MEDICINE

## 2022-10-13 PROCEDURE — 99214 OFFICE O/P EST MOD 30 MIN: CPT | Performed by: FAMILY MEDICINE

## 2022-10-13 PROCEDURE — 90471 IMMUNIZATION ADMIN: CPT | Performed by: FAMILY MEDICINE

## 2022-10-13 RX ORDER — ALBENDAZOLE 200 MG/1
TABLET, FILM COATED ORAL
Qty: 4 TABLET | Refills: 0 | Status: SHIPPED | OUTPATIENT
Start: 2022-10-13 | End: 2022-11-05

## 2022-10-13 NOTE — PROGRESS NOTES
FOLLOW-UP OFFICE VISIT  [facility] - [facility] OF [address] [place] [address]    NAME: Angelica Glez  AGE: 38 y o  SEX: female  : 1980     DATE: 10/13/2022     Assessment and Plan:     Problem List Items Addressed This Visit    None     Visit Diagnoses     Pinworms    -  Primary    Relevant Medications    albendazole (ALBENZA) 200 mg tablet    Encounter for immunization        Relevant Orders    influenza vaccine, quadrivalent, 0 5 mL, preservative-free, for adult and pediatric patients 6 mos+ (AFLURIA, FLUARIX, FLULAVAL, FLUZONE)            antiparasital provided-2 doses of 400mg PO QD; first dose now then the 2nd 2 weeks later  Return if symptoms worsen or fail to improve  Chief Complaint:     Chief Complaint   Patient presents with   • Physical Exam     Pt states that yesterday her rectum felt itchy and when she use the bathroom she noticed a pin worm in her stool        History of Present Illness:     Anal itch last night  Checked via a mirror and saw little white worms  Needs treamtent for pinworms  Review of Systems:     Review of Systems   Constitutional: Negative for chills and fever  Musculoskeletal: Negative for myalgias  Problem List:     Patient Active Problem List   Diagnosis   • Anxiety and depression   • Health care maintenance   • Tuberculosis screening   • Other irritable bowel syndrome   • Bloating   • Generalized abdominal pain   • Hepatic lesion   • Globus sensation   • Post-nasal drainage        Objective:     /64 (BP Location: Left arm, Patient Position: Sitting, Cuff Size: Standard)   Pulse 100   Temp 97 6 °F (36 4 °C) (Temporal)   Resp 18   Ht 5' 5" (1 651 m)   Wt 67 3 kg (148 lb 6 4 oz)   SpO2 98%   BMI 24 70 kg/m²     Physical Exam  HENT:      Head: Normocephalic and atraumatic  Eyes:      Conjunctiva/sclera: Conjunctivae normal    Cardiovascular:      Rate and Rhythm: Normal rate  Heart sounds: No murmur heard    Pulmonary: Effort: Pulmonary effort is normal    Neurological:      Mental Status: She is alert and oriented to person, place, and time     Psychiatric:         Mood and Affect: Mood normal          Behavior: Behavior normal            Car[facility]ardo[facility]  10/13/2022 2:55 PM

## 2022-10-13 NOTE — TELEPHONE ENCOUNTER
Pt called in regard to her message sent to Mario Alberto in regards to pin worm  Milagros Bolton had a 2:00 pm appt today 10/13/2022 I put Liyah Less in to see her  FYI Mario Alberto

## 2022-10-30 DIAGNOSIS — G25.81 RESTLESS LEG: ICD-10-CM

## 2022-10-30 RX ORDER — PRAMIPEXOLE DIHYDROCHLORIDE 0.25 MG/1
TABLET ORAL
Qty: 90 TABLET | Refills: 0 | Status: SHIPPED | OUTPATIENT
Start: 2022-10-30

## 2022-11-11 ENCOUNTER — OFFICE VISIT (OUTPATIENT)
Dept: INTERNAL MEDICINE CLINIC | Facility: CLINIC | Age: 42
End: 2022-11-11

## 2022-11-11 VITALS
HEIGHT: 65 IN | DIASTOLIC BLOOD PRESSURE: 78 MMHG | RESPIRATION RATE: 16 BRPM | HEART RATE: 76 BPM | SYSTOLIC BLOOD PRESSURE: 102 MMHG | WEIGHT: 146.5 LBS | OXYGEN SATURATION: 98 % | TEMPERATURE: 97.7 F | BODY MASS INDEX: 24.41 KG/M2

## 2022-11-11 DIAGNOSIS — L65.9 HAIR LOSS: Primary | ICD-10-CM

## 2022-11-14 NOTE — PROGRESS NOTES
Assessment/Plan:       Diagnoses and all orders for this visit:    Hair loss  -     17-Hydroxyprogesterone; Future  -     T3; Future  -     Ambulatory Referral to Endocrinology; Future  -     ACTH; Future  -     Cortisol; Future                Subjective:      Patient ID: Eduardo Issa is a 38 y o  female  Has had extensive workup for hair loss and the only abnormality is 739 hydroxy progesterone  However, this was not collected and appropriate time  It needs to be done before at a m  fasting  The following portions of the patient's history were reviewed and updated as appropriate:   She has a past medical history of Hair or hair follicle disorder  ,  does not have any pertinent problems on file  ,   has a past surgical history that includes Tooth extraction and Appendectomy  ,  family history includes Breast cancer in her family; Colon cancer in her maternal grandfather; Diverticulitis in her father; Ovarian cancer in her other; Ulcerative colitis in her mother  ,   reports that she has never smoked  She has never used smokeless tobacco  She reports that she does not drink alcohol and does not use drugs  ,  has No Known Allergies     Current Outpatient Medications   Medication Sig Dispense Refill   • pramipexole (MIRAPEX) 0 25 mg tablet TAKE 1 TABLET BY MOUTH EVERYDAY AT BEDTIME 90 tablet 0   • scopolamine (TRANSDERM-SCOP) 1 5 mg/3 days TD 72 hr patch Place 1 patch on the skin every third day (Patient taking differently: Place 1 patch on the skin as needed) 7 patch 0   • sertraline (ZOLOFT) 100 mg tablet TAKE 1 TABLET BY MOUTH EVERY DAY 90 tablet 3   • valACYclovir (VALTREX) 500 mg tablet TAKE 1 TABLET (500 MG TOTAL) BY MOUTH 2 (TWO) TIMES A DAY FOR 3 DAYS  3   • dicyclomine (BENTYL) 20 mg tablet Take 1 tablet (20 mg total) by mouth every 6 (six) hours for 30 days 120 tablet 6     No current facility-administered medications for this visit         Review of Systems   Skin:        Hair loss Objective:  Vitals:    11/11/22 1648   BP: 102/78   Pulse: 76   Resp: 16   Temp: 97 7 °F (36 5 °C)   SpO2: 98%      Physical Exam  Constitutional:       Appearance: Normal appearance  Neurological:      Mental Status: She is alert  There are no Patient Instructions on file for this visit

## 2022-11-21 DIAGNOSIS — F41.9 ANXIETY AND DEPRESSION: ICD-10-CM

## 2022-11-21 DIAGNOSIS — F32.A ANXIETY AND DEPRESSION: ICD-10-CM

## 2022-11-21 RX ORDER — SERTRALINE HYDROCHLORIDE 100 MG/1
TABLET, FILM COATED ORAL
Qty: 90 TABLET | Refills: 3 | Status: SHIPPED | OUTPATIENT
Start: 2022-11-21

## 2022-11-21 RX ORDER — SERTRALINE HYDROCHLORIDE 100 MG/1
100 TABLET, FILM COATED ORAL DAILY
Qty: 90 TABLET | Refills: 0 | Status: CANCELLED | OUTPATIENT
Start: 2022-11-21

## 2023-05-24 DIAGNOSIS — F32.A ANXIETY AND DEPRESSION: Primary | ICD-10-CM

## 2023-05-24 DIAGNOSIS — F41.9 ANXIETY AND DEPRESSION: Primary | ICD-10-CM

## 2023-05-24 RX ORDER — BUPROPION HYDROCHLORIDE 150 MG/1
150 TABLET ORAL EVERY MORNING
Qty: 30 TABLET | Refills: 5 | Status: SHIPPED | OUTPATIENT
Start: 2023-05-24 | End: 2023-06-15

## 2023-06-15 DIAGNOSIS — F41.9 ANXIETY AND DEPRESSION: ICD-10-CM

## 2023-06-15 DIAGNOSIS — F32.A ANXIETY AND DEPRESSION: ICD-10-CM

## 2023-06-15 RX ORDER — BUPROPION HYDROCHLORIDE 150 MG/1
TABLET ORAL
Qty: 90 TABLET | Refills: 2 | Status: SHIPPED | OUTPATIENT
Start: 2023-06-15

## 2023-07-29 DIAGNOSIS — R10.30 LOWER ABDOMINAL PAIN: ICD-10-CM

## 2023-07-29 DIAGNOSIS — K59.09 OTHER CONSTIPATION: ICD-10-CM

## 2023-07-31 RX ORDER — LINACLOTIDE 145 UG/1
145 CAPSULE, GELATIN COATED ORAL EVERY MORNING
Qty: 30 CAPSULE | Refills: 0 | Status: SHIPPED | OUTPATIENT
Start: 2023-07-31

## 2023-07-31 RX ORDER — LINACLOTIDE 145 UG/1
145 CAPSULE, GELATIN COATED ORAL EVERY MORNING
Qty: 30 CAPSULE | Refills: 2 | OUTPATIENT
Start: 2023-07-31

## 2023-09-03 DIAGNOSIS — K59.09 OTHER CONSTIPATION: ICD-10-CM

## 2023-09-03 DIAGNOSIS — R10.30 LOWER ABDOMINAL PAIN: ICD-10-CM

## 2023-09-05 RX ORDER — LINACLOTIDE 145 UG/1
145 CAPSULE, GELATIN COATED ORAL EVERY MORNING
Qty: 30 CAPSULE | Refills: 0 | Status: SHIPPED | OUTPATIENT
Start: 2023-09-05

## 2023-09-25 ENCOUNTER — TELEPHONE (OUTPATIENT)
Age: 43
End: 2023-09-25

## 2023-09-25 NOTE — TELEPHONE ENCOUNTER
Patients GI provider:  Dr. Armstrong Fill    Number to return call: [phone]    Reason for call: Pt calling as she has not received her prep as yet. Please send script for Golytely over to her pharmacy on file.  Sent prep instructions via [employer]    Scheduled procedure/appointment date if applicable: Procedure 18/51/2668

## 2023-09-26 ENCOUNTER — TELEPHONE (OUTPATIENT)
Age: 43
End: 2023-09-26

## 2023-09-26 DIAGNOSIS — Z80.0 FAMILY HISTORY OF COLON CANCER: Primary | ICD-10-CM

## 2023-09-26 NOTE — TELEPHONE ENCOUNTER
ReScheduled date of colonoscopy (as of today): 11/9/23    Physician performing colonoscopy: Dr. Mekhi Falcon    Location of colonoscopy: [facility]    Bowel prep reviewed with patient: Miralax/Dulcolax    Instructions reviewed with patient by: Frandy De La Cruz., sent via [employer]    Clearances: N/A

## 2023-09-26 NOTE — TELEPHONE ENCOUNTER
Lm for ptn letting her know I sent the prescription over to Dr. Leonel Martino and to wait for [facility] to let her know its ready for

## 2023-10-06 ENCOUNTER — OFFICE VISIT (OUTPATIENT)
Age: 43
End: 2023-10-06
Payer: COMMERCIAL

## 2023-10-06 VITALS
DIASTOLIC BLOOD PRESSURE: 68 MMHG | HEART RATE: 74 BPM | OXYGEN SATURATION: 98 % | BODY MASS INDEX: 23.16 KG/M2 | RESPIRATION RATE: 18 BRPM | TEMPERATURE: 97.5 F | SYSTOLIC BLOOD PRESSURE: 100 MMHG | WEIGHT: 139.2 LBS

## 2023-10-06 DIAGNOSIS — F99 INSOMNIA DUE TO OTHER MENTAL DISORDER: ICD-10-CM

## 2023-10-06 DIAGNOSIS — F32.A ANXIETY AND DEPRESSION: ICD-10-CM

## 2023-10-06 DIAGNOSIS — K58.8 OTHER IRRITABLE BOWEL SYNDROME: ICD-10-CM

## 2023-10-06 DIAGNOSIS — Z11.59 NEED FOR HEPATITIS C SCREENING TEST: ICD-10-CM

## 2023-10-06 DIAGNOSIS — K76.9 HEPATIC LESION: ICD-10-CM

## 2023-10-06 DIAGNOSIS — F41.9 ANXIETY AND DEPRESSION: ICD-10-CM

## 2023-10-06 DIAGNOSIS — F51.05 INSOMNIA DUE TO OTHER MENTAL DISORDER: ICD-10-CM

## 2023-10-06 DIAGNOSIS — D18.03 HEMANGIOMA OF LIVER: ICD-10-CM

## 2023-10-06 DIAGNOSIS — Z00.00 ANNUAL PHYSICAL EXAM: Primary | ICD-10-CM

## 2023-10-06 PROBLEM — Z11.1 TUBERCULOSIS SCREENING: Status: RESOLVED | Noted: 2018-12-27 | Resolved: 2023-10-06

## 2023-10-06 PROCEDURE — 99214 OFFICE O/P EST MOD 30 MIN: CPT | Performed by: FAMILY MEDICINE

## 2023-10-06 PROCEDURE — 99396 PREV VISIT EST AGE 40-64: CPT | Performed by: FAMILY MEDICINE

## 2023-10-06 RX ORDER — HYDROXYZINE HYDROCHLORIDE 25 MG/1
25 TABLET, FILM COATED ORAL
Qty: 20 TABLET | Refills: 0 | Status: SHIPPED | OUTPATIENT
Start: 2023-10-06 | End: 2023-10-26

## 2023-10-06 NOTE — PROGRESS NOTES
ADULT ANNUAL [facility] [place]    NAME: Charu Berg  AGE: 41 y.o. SEX: female  : 1980     DATE: 10/6/2023     Assessment and Plan:     Problem List Items Addressed This Visit        Digestive    Other irritable bowel syndrome-constipation dominant. On Linzess therapy. Follows with GI. Hemangioma of liver-lesion noted on imaging study in 2019. A follow-up MRCP was completed and showed a benign hemangioma. will continue to follow. RESOLVED: Hepatic lesion       Other    Anxiety and depression-depression is well controlled with Wellbutrin 150 mg daily however anxiety has been poorly controlled. Continue to follow with pattern. Trial hydroxyzine at nighttime generalizing. Encouraged to reestablish with therapy. Referral placed. Relevant Medications    hydrOXYzine HCL (ATARAX) 25 mg tablet    Other Relevant Orders    Ambulatory Referral to [address] [place] [address]   Other Visit Diagnoses     Annual physical exam    -  Primary    Relevant Orders    TSH, 3rd generation with Free T4 reflex    Comprehensive metabolic panel    Lipid Panel with Direct LDL reflex    CBC and differential    Insomnia due to other mental disorder    -anxiety related see above. Relevant Medications    hydrOXYzine HCL (ATARAX) 25 mg tablet    Need for hepatitis C screening test        Relevant Orders    Hepatitis C Antibody          Immunizations and preventive care screenings were discussed with patient today. Appropriate education was printed on patient's after visit summary. Counseling:  · Dental Health: discussed importance of regular tooth brushing, flossing, and dental visits.             Chief Complaint:     Chief Complaint   Patient presents with   • Annual Exam     Pt is here for her annual former [place] pt would like to discuss some concerns with provider      History of Present Illness:     Adult Annual Physical   Patient here for a comprehensive physical exam.   Pt reports poor sleep. Issue for years. Has episodes of panic attacks when she starts to ruminetate over death and dying. Occurs nightly. Has tried meditation and it help with the anxiety a little bit but doesn't help her get more hours of sleep. Hx of therapy but hasn't seen a therapist since 6 months. Diet and Physical Activity  · Diet/Nutrition: well balanced diet. · Exercise: walking. Depression Screening  PHQ-2/9 Depression Screening    Little interest or pleasure in doing things: 0 - not at all  Feeling down, depressed, or hopeless: 0 - not at all  Trouble falling or staying asleep, or sleeping too much: 0 - not at all  Feeling tired or having little energy: 0 - not at all  Poor appetite or overeatin - not at all  Feeling bad about yourself - or that you are a failure or have let yourself or your family down: 0 - not at all  Trouble concentrating on things, such as reading the newspaper or watching television: 0 - not at all  Moving or speaking so slowly that other people could have noticed. Or the opposite - being so fidgety or restless that you have been moving around a lot more than usual: 0 - not at all  Thoughts that you would be better off dead, or of hurting yourself in some way: 0 - not at all  PHQ-9 Score: 0   PHQ-9 Interpretation: No or Minimal depression        General Health  · Sleep: sleeps poorly. · Hearing: normal - bilateral.  · Vision: wears glasses. · Dental: regular dental visits. /GYN Health  · Patient is: premenopausal  · Lastmammo- 2023  · Last pap- less than 5 years. · Last colo- has an upcoming      Review of Systems:     Review of Systems   Constitutional: Negative for fever. Respiratory: Negative for shortness of breath. Cardiovascular: Negative for chest pain. Gastrointestinal: Positive for constipation. Neurological: Negative for headaches. Psychiatric/Behavioral: Positive for sleep disturbance.  The patient is nervous/anxious. Past Medical History:     Past Medical History:   Diagnosis Date   • Hair or hair follicle disorder       Past Surgical History:     Past Surgical History:   Procedure Laterality Date   • APPENDECTOMY     • TOOTH EXTRACTION      [place]      Social History:     Social History     Socioeconomic History   • Marital status: /Civil Union     Spouse name: None   • Number of children: None   • Years of education: None   • Highest education level: None   Occupational History   • None   Tobacco Use   • Smoking status: Never   • Smokeless tobacco: Never   Vaping Use   • Vaping Use: Never used   Substance and Sexual Activity   • Alcohol use: No   • Drug use: No   • Sexual activity: Yes     Birth control/protection: None   Other Topics Concern   • None   Social History Narrative   • None     Social Determinants of Health     Financial Resource Strain: Not on file   Food Insecurity: Not on file   Transportation Needs: Not on file   Physical Activity: Sufficiently Active (5/3/2021)    Exercise Vital Sign    • Days of Exercise per Week: 5 days    • Minutes of Exercise per Session: 70 min   Stress: No Stress Concern Present (5/3/2021)    [facility]    • Feeling of Stress :  Only a little   Social Connections: Not on file   Intimate Partner Violence: Not on file   Housing Stability: Not on file      Family History:     Family History   Problem Relation Age of Onset   • Ulcerative colitis Mother    • Diverticulitis Father    • Colon cancer Maternal Grandfather    • Breast cancer Family         multiple family members   • Ovarian cancer Other       Current Medications:     Current Outpatient Medications   Medication Sig Dispense Refill   • buPROPion (WELLBUTRIN XL) 150 mg 24 hr tablet TAKE 1 TABLET BY MOUTH EVERY DAY IN THE MORNING 90 tablet 2   • hydrOXYzine HCL (ATARAX) 25 mg tablet Take 1 tablet (25 mg total) by mouth daily at bedtime for 20 days 20 tablet 0   • Linzess 145 MCG CAPS TAKE 1 CAPSULE (145 MCG TOTAL) BY MOUTH EVERY MORNING 30 capsule 0   • pramipexole (MIRAPEX) 0.25 mg tablet TAKE 1 TABLET BY MOUTH EVERYDAY AT BEDTIME 90 tablet 0   • valACYclovir (VALTREX) 500 mg tablet TAKE 1 TABLET (500 MG TOTAL) BY MOUTH 2 (TWO) TIMES A DAY FOR 3 DAYS. 3   • dicyclomine (BENTYL) 20 mg tablet Take 1 tablet (20 mg total) by mouth every 6 (six) hours for 30 days (Patient not taking: Reported on 10/6/2023) 120 tablet 6     No current facility-administered medications for this visit. Allergies:     No Known Allergies   Physical Exam:     /68 (BP Location: Left arm, Patient Position: Sitting, Cuff Size: Standard)   Pulse 74   Temp 97.5 °F (36.4 °C) (Temporal)   Resp 18   Wt 63.1 kg (139 lb 3.2 oz)   SpO2 98%   BMI 23.16 kg/m²     Physical Exam  Vitals and nursing note reviewed. Constitutional:       General: She is not in acute distress. Appearance: She is well-developed. HENT:      Head: Normocephalic and atraumatic. Right Ear: Tympanic membrane and external ear normal.      Left Ear: External ear normal.      Mouth/Throat:      Mouth: Mucous membranes are moist.      Pharynx: Oropharynx is clear. Eyes:      Extraocular Movements: Extraocular movements intact. Conjunctiva/sclera: Conjunctivae normal.      Pupils: Pupils are equal, round, and reactive to light. Cardiovascular:      Rate and Rhythm: Normal rate and regular rhythm. Heart sounds: No murmur heard. Pulmonary:      Effort: Pulmonary effort is normal. No respiratory distress. Breath sounds: Normal breath sounds. Abdominal:      Palpations: Abdomen is soft. Tenderness: There is no abdominal tenderness. Musculoskeletal:         General: No swelling. Skin:     General: Skin is warm and dry. Capillary Refill: Capillary refill takes less than 2 seconds.    Neurological:      Mental Status: She is alert and oriented to person, place, and time.    Psychiatric:         Mood and Affect: Mood normal.         Behavior: Behavior normal.          Ileana Anderson DO  [address]

## 2023-10-19 ENCOUNTER — PATIENT MESSAGE (OUTPATIENT)
Age: 43
End: 2023-10-19

## 2023-10-19 DIAGNOSIS — F51.05 INSOMNIA DUE TO OTHER MENTAL DISORDER: Primary | ICD-10-CM

## 2023-10-19 DIAGNOSIS — F99 INSOMNIA DUE TO OTHER MENTAL DISORDER: Primary | ICD-10-CM

## 2023-10-20 RX ORDER — MIRTAZAPINE 15 MG/1
15 TABLET, FILM COATED ORAL
Qty: 30 TABLET | Refills: 0 | Status: SHIPPED | OUTPATIENT
Start: 2023-10-20

## 2023-11-06 ENCOUNTER — TELEPHONE (OUTPATIENT)
Age: 43
End: 2023-11-06

## 2023-11-06 NOTE — TELEPHONE ENCOUNTER
Spoke with Matt Singh, confirmed colonoscopy for 11/09/23. Patient is ready to go has everything she needs and her ride.

## 2023-11-06 NOTE — TELEPHONE ENCOUNTER
Patient is scheduled 11/09/23 for colonoscopy with Dr. Zarate, Patient wanted to know if she can add an EGD. Heartburn and GERD symptoms since office visit.  Please call Sandy Rousseau at [phone]

## 2023-11-07 ENCOUNTER — PREP FOR PROCEDURE (OUTPATIENT)
Age: 43
End: 2023-11-07

## 2023-11-07 DIAGNOSIS — R12 HEARTBURN: ICD-10-CM

## 2023-11-07 DIAGNOSIS — K21.9 GASTROESOPHAGEAL REFLUX DISEASE, UNSPECIFIED WHETHER ESOPHAGITIS PRESENT: Primary | ICD-10-CM

## 2023-11-07 NOTE — TELEPHONE ENCOUNTER
Spoke to ptn, let her know we added Egd and we submitted it for [facility] approval. Also did let her know worse comes to worse if it does not go through I will call her and let her know and we will remove the EGD.  Ptn understood

## 2023-11-09 ENCOUNTER — ANESTHESIA EVENT (OUTPATIENT)
Dept: GASTROENTEROLOGY | Facility: HOSPITAL | Age: 43
End: 2023-11-09

## 2023-11-09 ENCOUNTER — HOSPITAL ENCOUNTER (OUTPATIENT)
Dept: GASTROENTEROLOGY | Facility: HOSPITAL | Age: 43
Setting detail: OUTPATIENT SURGERY
End: 2023-11-09
Attending: INTERNAL MEDICINE
Payer: COMMERCIAL

## 2023-11-09 ENCOUNTER — ANESTHESIA (OUTPATIENT)
Dept: GASTROENTEROLOGY | Facility: HOSPITAL | Age: 43
End: 2023-11-09

## 2023-11-09 VITALS
HEART RATE: 71 BPM | DIASTOLIC BLOOD PRESSURE: 79 MMHG | BODY MASS INDEX: 23.03 KG/M2 | WEIGHT: 138.23 LBS | RESPIRATION RATE: 16 BRPM | HEIGHT: 65 IN | TEMPERATURE: 98.4 F | OXYGEN SATURATION: 100 % | SYSTOLIC BLOOD PRESSURE: 121 MMHG

## 2023-11-09 DIAGNOSIS — R14.0 BLOATING: ICD-10-CM

## 2023-11-09 DIAGNOSIS — R10.30 LOWER ABDOMINAL PAIN: ICD-10-CM

## 2023-11-09 DIAGNOSIS — K59.09 OTHER CONSTIPATION: ICD-10-CM

## 2023-11-09 DIAGNOSIS — K21.9 GASTROESOPHAGEAL REFLUX DISEASE, UNSPECIFIED WHETHER ESOPHAGITIS PRESENT: ICD-10-CM

## 2023-11-09 DIAGNOSIS — R12 HEARTBURN: ICD-10-CM

## 2023-11-09 DIAGNOSIS — Z80.0 FAMILY HISTORY OF COLON CANCER: ICD-10-CM

## 2023-11-09 LAB
EXT PREGNANCY TEST URINE: NEGATIVE
EXT. CONTROL: NORMAL

## 2023-11-09 PROCEDURE — 43239 EGD BIOPSY SINGLE/MULTIPLE: CPT | Performed by: INTERNAL MEDICINE

## 2023-11-09 PROCEDURE — 88305 TISSUE EXAM BY PATHOLOGIST: CPT | Performed by: PATHOLOGY

## 2023-11-09 PROCEDURE — 81025 URINE PREGNANCY TEST: CPT | Performed by: ANESTHESIOLOGY

## 2023-11-09 PROCEDURE — 45378 DIAGNOSTIC COLONOSCOPY: CPT | Performed by: INTERNAL MEDICINE

## 2023-11-09 RX ORDER — SODIUM CHLORIDE, SODIUM LACTATE, POTASSIUM CHLORIDE, CALCIUM CHLORIDE 600; 310; 30; 20 MG/100ML; MG/100ML; MG/100ML; MG/100ML
INJECTION, SOLUTION INTRAVENOUS CONTINUOUS PRN
Status: DISCONTINUED | OUTPATIENT
Start: 2023-11-09 | End: 2023-11-09

## 2023-11-09 RX ORDER — LIDOCAINE HYDROCHLORIDE 20 MG/ML
INJECTION, SOLUTION EPIDURAL; INFILTRATION; INTRACAUDAL; PERINEURAL AS NEEDED
Status: DISCONTINUED | OUTPATIENT
Start: 2023-11-09 | End: 2023-11-09

## 2023-11-09 RX ORDER — PROPOFOL 10 MG/ML
INJECTION, EMULSION INTRAVENOUS AS NEEDED
Status: DISCONTINUED | OUTPATIENT
Start: 2023-11-09 | End: 2023-11-09

## 2023-11-09 RX ADMIN — PROPOFOL 50 MG: 10 INJECTION, EMULSION INTRAVENOUS at 09:03

## 2023-11-09 RX ADMIN — PROPOFOL 20 MG: 10 INJECTION, EMULSION INTRAVENOUS at 09:06

## 2023-11-09 RX ADMIN — PROPOFOL 20 MG: 10 INJECTION, EMULSION INTRAVENOUS at 09:10

## 2023-11-09 RX ADMIN — SODIUM CHLORIDE, SODIUM LACTATE, POTASSIUM CHLORIDE, AND CALCIUM CHLORIDE: .6; .31; .03; .02 INJECTION, SOLUTION INTRAVENOUS at 08:56

## 2023-11-09 RX ADMIN — LIDOCAINE HYDROCHLORIDE 100 MG: 20 INJECTION, SOLUTION EPIDURAL; INFILTRATION; INTRACAUDAL; PERINEURAL at 08:59

## 2023-11-09 RX ADMIN — PROPOFOL 50 MG: 10 INJECTION, EMULSION INTRAVENOUS at 09:01

## 2023-11-09 RX ADMIN — PROPOFOL 150 MG: 10 INJECTION, EMULSION INTRAVENOUS at 08:59

## 2023-11-09 RX ADMIN — PROPOFOL 20 MG: 10 INJECTION, EMULSION INTRAVENOUS at 09:12

## 2023-11-09 RX ADMIN — PROPOFOL 20 MG: 10 INJECTION, EMULSION INTRAVENOUS at 09:08

## 2023-11-09 NOTE — ANESTHESIA POSTPROCEDURE EVALUATION
Post-Op Assessment Note    CV Status:  Stable  Pain Score: 0    Pain management: adequate     Mental Status:  Sleepy   Hydration Status:  Euvolemic   PONV Controlled:  Controlled   Airway Patency:  Patent      Post Op Vitals Reviewed: Yes      Staff: CRNA, Anesthesiologist         No notable events documented.     /63 (11/09/23 0917)    Temp 98.4 °F (36.9 °C) (11/09/23 0917)    Pulse 71 (11/09/23 0917)   Resp 16 (11/09/23 0917)    SpO2 100 % (11/09/23 0917)

## 2023-11-09 NOTE — ANESTHESIA PREPROCEDURE EVALUATION
Procedure:  COLONOSCOPY  EGD    Relevant Problems   ANESTHESIA (within normal limits)      CARDIO (within normal limits)   (+) Hemangioma of liver      ENDO (within normal limits)      GI/HEPATIC   (+) Hemangioma of liver      GYN   (-) Currently pregnant      HEMATOLOGY (within normal limits)      MUSCULOSKELETAL (within normal limits)      NEURO/PSYCH   (+) Anxiety and depression      PULMONARY (within normal limits)        Physical Exam    Airway    Mallampati score: I  TM Distance: >3 FB  Neck ROM: full     Dental   No notable dental hx     Cardiovascular  Cardiovascular exam normal    Pulmonary  Pulmonary exam normal     Other Findings        Anesthesia Plan  ASA Score- 1     Anesthesia Type- IV sedation with anesthesia with ASA Monitors. Additional Monitors:     Airway Plan:            Plan Factors-Exercise tolerance (METS): >4 METS. Chart reviewed. Patient summary reviewed. Patient is not a current smoker. Induction- intravenous. Postoperative Plan-     Informed Consent- Anesthetic plan and risks discussed with patient. I personally reviewed this patient with the CRNA. Discussed and agreed on the Anesthesia Plan with the CRNA. Erich Trevino Pt requesting to have surgeries 1 week apart.

## 2023-11-09 NOTE — H&P
History and Physical - [facility]  Lillian George 41 y.o. female MRN: [id number]                  HPI: Lillian George is a 41y.o. year old female who presents for EGD and colonoscopy for GERD, lower abdominal pain, constipation and bloating, family history of colon cancer. No prior colonoscopy      REVIEW OF SYSTEMS: Per the HPI, and otherwise unremarkable. Historical Information   Past Medical History:   Diagnosis Date    GERD (gastroesophageal reflux disease)     Hair or hair follicle disorder     Irritable bowel syndrome      Past Surgical History:   Procedure Laterality Date    APPENDECTOMY      COLONOSCOPY      TOOTH EXTRACTION      [place]     Social History   Social History     Substance and Sexual Activity   Alcohol Use No     Social History     Substance and Sexual Activity   Drug Use No     Social History     Tobacco Use   Smoking Status Never   Smokeless Tobacco Never     Family History   Problem Relation Age of Onset    Ulcerative colitis Mother     Diverticulitis Father     Colon cancer Maternal Grandfather     Breast cancer Family         multiple family members    Ovarian cancer Other        Meds/Allergies     (Not in a hospital admission)      No Known Allergies    Objective     Blood pressure 117/78, pulse 80, temperature 97.8 °F (36.6 °C), temperature source Temporal, resp. rate 17, height 5' 5" (1.651 m), weight 62.7 kg (138 lb 3.7 oz), last menstrual period 10/11/2023, SpO2 99 %.       PHYSICAL EXAM    Gen: NAD  CV: RRR  CHEST: Clear  ABD: soft, NT/ND  EXT: no edema  Neuro: AAO      ASSESSMENT/PLAN:  This is a 41y.o. year old female here for GERD, lower abdominal pain, constipation and bloating, family history of colon cancer    PLAN:   Procedure: EGD and colonoscopy

## 2023-11-11 DIAGNOSIS — F51.05 INSOMNIA DUE TO OTHER MENTAL DISORDER: ICD-10-CM

## 2023-11-11 DIAGNOSIS — F99 INSOMNIA DUE TO OTHER MENTAL DISORDER: ICD-10-CM

## 2023-11-14 PROCEDURE — 88305 TISSUE EXAM BY PATHOLOGIST: CPT | Performed by: PATHOLOGY

## 2023-11-14 RX ORDER — MIRTAZAPINE 15 MG/1
15 TABLET, FILM COATED ORAL
Qty: 90 TABLET | Refills: 1 | Status: SHIPPED | OUTPATIENT
Start: 2023-11-14

## 2024-01-30 ENCOUNTER — PATIENT MESSAGE (OUTPATIENT)
Age: 44
End: 2024-01-30

## 2024-01-31 ENCOUNTER — PATIENT MESSAGE (OUTPATIENT)
Age: 44
End: 2024-01-31

## 2024-01-31 DIAGNOSIS — F32.A ANXIETY AND DEPRESSION: ICD-10-CM

## 2024-01-31 DIAGNOSIS — F41.9 ANXIETY AND DEPRESSION: ICD-10-CM

## 2024-02-02 RX ORDER — BUSPIRONE HYDROCHLORIDE 5 MG/1
5 TABLET ORAL 2 TIMES DAILY
Qty: 60 TABLET | Refills: 1 | Status: SHIPPED | OUTPATIENT
Start: 2024-02-02

## 2024-02-02 RX ORDER — BUPROPION HYDROCHLORIDE 300 MG/1
300 TABLET ORAL EVERY MORNING
Qty: 30 TABLET | Refills: 1 | Status: SHIPPED | OUTPATIENT
Start: 2024-02-02

## 2024-02-24 DIAGNOSIS — F32.A ANXIETY AND DEPRESSION: ICD-10-CM

## 2024-02-24 DIAGNOSIS — F41.9 ANXIETY AND DEPRESSION: ICD-10-CM

## 2024-02-24 RX ORDER — BUSPIRONE HYDROCHLORIDE 5 MG/1
5 TABLET ORAL 2 TIMES DAILY
Qty: 180 TABLET | Refills: 0 | Status: SHIPPED | OUTPATIENT
Start: 2024-02-24

## 2024-02-24 RX ORDER — BUPROPION HYDROCHLORIDE 300 MG/1
300 TABLET ORAL EVERY MORNING
Qty: 90 TABLET | Refills: 0 | Status: SHIPPED | OUTPATIENT
Start: 2024-02-24

## 2024-03-06 DIAGNOSIS — K59.09 OTHER CONSTIPATION: ICD-10-CM

## 2024-03-06 DIAGNOSIS — R10.30 LOWER ABDOMINAL PAIN: ICD-10-CM

## 2024-03-06 RX ORDER — LINACLOTIDE 145 UG/1
145 CAPSULE, GELATIN COATED ORAL EVERY MORNING
Qty: 30 CAPSULE | Refills: 5 | Status: SHIPPED | OUTPATIENT
Start: 2024-03-06

## 2024-04-19 ENCOUNTER — OFFICE VISIT (OUTPATIENT)
Age: 44
End: 2024-04-19
Payer: COMMERCIAL

## 2024-04-19 VITALS
HEART RATE: 65 BPM | TEMPERATURE: 97.9 F | DIASTOLIC BLOOD PRESSURE: 77 MMHG | WEIGHT: 149 LBS | SYSTOLIC BLOOD PRESSURE: 120 MMHG | OXYGEN SATURATION: 97 % | RESPIRATION RATE: 18 BRPM | HEIGHT: 65 IN | BODY MASS INDEX: 24.83 KG/M2

## 2024-04-19 DIAGNOSIS — H60.502 ACUTE NONINFECTIVE OTITIS EXTERNA OF LEFT EAR, UNSPECIFIED TYPE: Primary | ICD-10-CM

## 2024-04-19 PROCEDURE — 99213 OFFICE O/P EST LOW 20 MIN: CPT | Performed by: PHYSICIAN ASSISTANT

## 2024-04-19 RX ORDER — CIPROFLOXACIN AND DEXAMETHASONE 3; 1 MG/ML; MG/ML
4 SUSPENSION/ DROPS AURICULAR (OTIC) 2 TIMES DAILY
Qty: 7.5 ML | Refills: 0 | Status: SHIPPED | OUTPATIENT
Start: 2024-04-19 | End: 2024-04-24

## 2024-04-19 NOTE — PROGRESS NOTES
[facility]        NAME: Deisi Rivera is a 43 y.o. female  : 1980    MRN: [id number]  DATE: 2024  TIME: 11:41 AM    Assessment and Plan   Acute noninfective otitis externa of left ear, unspecified type [H60.502]  1. Acute noninfective otitis externa of left ear, unspecified type  ciprofloxacin-dexamethasone (CIPRODEX) otic suspension          Ciprodex provided for symptomatic treatment as well as preventative for any infection chronic there is no current evidence of infectious otitis externa there is only mild erythema and serous effusion behind TM.    Patient Instructions   There are no Patient Instructions on file for this visit.    Follow up with PCP in 3-5 days.  Proceed to  ER if symptoms worsen.    If tests are performed, our office will contact you with results only if   changes need to made to the care plan discussed with you at the visit.   [facility]     Chief Complaint     Chief Complaint   Patient presents with   • Earache     Patient complains of left ear pain,started a month ago, but has gotten worse.          History of Present Illness       HPI  Patient is in complaining of left ear pain for the past month.  She reports it is itchy burning sometimes.  She does use Q-tips.  She denies any drainage from the ear no fevers or chills.  No discharge or trouble hearing.  She denies any treatment for this condition besides stopping the Q-tips in the last week or 2 which has been slightly improving her symptoms.    Review of Systems   Review of Systems  All other related systems reviewed and are negative except as noted in HPI    Current Medications       Current Outpatient Medications:   •  buPROPion (WELLBUTRIN XL) 300 mg 24 hr tablet, TAKE 1 TABLET (300 MG TOTAL) BY MOUTH EVERY MORNING., Disp: 90 tablet, Rfl: 0  •  busPIRone (BUSPAR) 5 mg tablet, TAKE 1 TABLET BY MOUTH TWICE A DAY, Disp: 180 tablet, Rfl: 0  •   "ciprofloxacin-dexamethasone (CIPRODEX) otic suspension, Administer 4 drops into the left ear 2 (two) times a day for 5 days, Disp: 7.5 mL, Rfl: 0  •  pramipexole (MIRAPEX) 0.25 mg tablet, TAKE 1 TABLET BY MOUTH EVERYDAY AT BEDTIME, Disp: 90 tablet, Rfl: 0  •  dicyclomine (BENTYL) 20 mg tablet, Take 1 tablet (20 mg total) by mouth every 6 (six) hours for 30 days, Disp: 120 tablet, Rfl: 6  •  hydrOXYzine HCL (ATARAX) 25 mg tablet, Take 1 tablet (25 mg total) by mouth daily at bedtime for 20 days, Disp: 20 tablet, Rfl: 0  •  linaCLOtide (Linzess) 145 MCG CAPS, TAKE 1 CAPSULE (145 MCG TOTAL) BY MOUTH EVERY MORNING (Patient not taking: Reported on 4/19/2024), Disp: 30 capsule, Rfl: 5  •  valACYclovir (VALTREX) 500 mg tablet, TAKE 1 TABLET (500 MG TOTAL) BY MOUTH 2 (TWO) TIMES A DAY FOR 3 DAYS. (Patient not taking: Reported on 4/19/2024), Disp: , Rfl: 3    Current Allergies     Allergies as of 04/19/2024   • (No Known Allergies)            The following portions of the patient's history were reviewed and updated as appropriate: allergies, current medications, past family history, past medical history, past social history, past surgical history and problem list.     Past Medical History:   Diagnosis Date   • GERD (gastroesophageal reflux disease)    • Hair or hair follicle disorder    • Irritable bowel syndrome        Past Surgical History:   Procedure Laterality Date   • APPENDECTOMY     • COLONOSCOPY     • TOOTH EXTRACTION      [place]       Family History   Problem Relation Age of Onset   • Ulcerative colitis Mother    • Diverticulitis Father    • Colon cancer Maternal Grandfather    • Breast cancer Family         multiple family members   • Ovarian cancer Other          Medications have been verified.        Objective   /77   Pulse 65   Temp 97.9 °F (36.6 °C)   Resp 18   Ht 5' 5\" (1.651 m)   Wt 67.6 kg (149 lb)   SpO2 97%   BMI 24.79 kg/m²   No LMP recorded.       Physical Exam     Physical " "Exam  Constitutional:       General: She is not in acute distress.     Appearance: She is well-developed. She is not diaphoretic.   HENT:      Head: Normocephalic and atraumatic.      Right Ear: Ear canal and external ear normal. A middle ear effusion is present. No hemotympanum. Tympanic membrane is not injected, scarred, perforated or erythematous.      Left Ear: External ear normal. A middle ear effusion is present. No hemotympanum. Tympanic membrane is bulging. Tympanic membrane is not injected, scarred, perforated or erythematous.      Ears:      Comments: Slight erythema in the left ear canal  Eyes:      General: No scleral icterus.     Conjunctiva/sclera: Conjunctivae normal.   Neck:      Trachea: No tracheal deviation.   Cardiovascular:      Rate and Rhythm: Normal rate and regular rhythm.      Heart sounds: Normal heart sounds. No murmur heard.  Pulmonary:      Effort: Pulmonary effort is normal. No respiratory distress.      Breath sounds: Normal breath sounds. No stridor. No wheezing, rhonchi or rales.   Musculoskeletal:      Cervical back: Normal range of motion and neck supple.   Lymphadenopathy:      Cervical: No cervical adenopathy.   Skin:     General: Skin is warm and dry.      Findings: No erythema.   Neurological:      Mental Status: She is alert and oriented to person, place, and time.   Psychiatric:         Behavior: Behavior normal.         Ortho Exam        Procedures  No Procedures performed today        Note: Portions of this record may have been created with voice recognition software. Occasional wrong word or \"sound a like\" substitutions may have occurred due to the inherent limitations of voice recognition software. Please read the chart carefully and recognize, using context, where substitutions have occurred.*      "

## 2024-05-13 ENCOUNTER — APPOINTMENT (EMERGENCY)
Dept: CT IMAGING | Facility: HOSPITAL | Age: 44
End: 2024-05-13
Payer: COMMERCIAL

## 2024-05-13 ENCOUNTER — HOSPITAL ENCOUNTER (EMERGENCY)
Facility: HOSPITAL | Age: 44
Discharge: HOME | End: 2024-05-13
Attending: EMERGENCY MEDICINE
Payer: COMMERCIAL

## 2024-05-13 VITALS
SYSTOLIC BLOOD PRESSURE: 118 MMHG | TEMPERATURE: 98 F | OXYGEN SATURATION: 100 % | DIASTOLIC BLOOD PRESSURE: 65 MMHG | HEART RATE: 72 BPM | RESPIRATION RATE: 18 BRPM

## 2024-05-13 DIAGNOSIS — K85.90 ACUTE PANCREATITIS: Primary | ICD-10-CM

## 2024-05-13 LAB
ALBUMIN SERPL BCP-MCNC: 4.2 G/DL (ref 3.5–5)
ALP SERPL-CCNC: 47 U/L (ref 34–104)
ALT SERPL W P-5'-P-CCNC: 11 U/L (ref 7–52)
ANION GAP SERPL CALCULATED.3IONS-SCNC: 8 MMOL/L (ref 4–13)
AST SERPL W P-5'-P-CCNC: 13 U/L (ref 13–39)
BACTERIA UR QL AUTO: ABNORMAL /HPF
BASOPHILS # BLD AUTO: 0.01 THOUSANDS/ÂΜL (ref 0–0.1)
BASOPHILS NFR BLD AUTO: 0 % (ref 0–1)
BILIRUB SERPL-MCNC: 0.36 MG/DL (ref 0.2–1)
BILIRUB UR QL STRIP: NEGATIVE
BUN SERPL-MCNC: 12 MG/DL (ref 5–25)
CALCIUM SERPL-MCNC: 9 MG/DL (ref 8.4–10.2)
CHLORIDE SERPL-SCNC: 106 MMOL/L (ref 96–108)
CLARITY UR: CLEAR
CO2 SERPL-SCNC: 24 MMOL/L (ref 21–32)
COLOR UR: YELLOW
CREAT SERPL-MCNC: 0.79 MG/DL (ref 0.6–1.3)
EOSINOPHIL # BLD AUTO: 0.12 THOUSAND/ÂΜL (ref 0–0.61)
EOSINOPHIL NFR BLD AUTO: 2 % (ref 0–6)
ERYTHROCYTE [DISTWIDTH] IN BLOOD BY AUTOMATED COUNT: 11.7 % (ref 11.6–15.1)
EXT PREGNANCY TEST URINE: NEGATIVE
EXT. CONTROL: NORMAL
GFR SERPL CREATININE-BSD FRML MDRD: 93 ML/MIN/1.73SQ M
GLUCOSE SERPL-MCNC: 99 MG/DL (ref 65–140)
GLUCOSE UR STRIP-MCNC: NEGATIVE MG/DL
HCT VFR BLD AUTO: 42.1 % (ref 34.8–46.1)
HGB BLD-MCNC: 14.5 G/DL (ref 11.5–15.4)
HGB UR QL STRIP.AUTO: ABNORMAL
IMM GRANULOCYTES # BLD AUTO: 0.01 THOUSAND/UL (ref 0–0.2)
IMM GRANULOCYTES NFR BLD AUTO: 0 % (ref 0–2)
KETONES UR STRIP-MCNC: ABNORMAL MG/DL
LEUKOCYTE ESTERASE UR QL STRIP: ABNORMAL
LIPASE SERPL-CCNC: 1284 U/L (ref 11–82)
LYMPHOCYTES # BLD AUTO: 1.42 THOUSANDS/ÂΜL (ref 0.6–4.47)
LYMPHOCYTES NFR BLD AUTO: 22 % (ref 14–44)
MCH RBC QN AUTO: 30.7 PG (ref 26.8–34.3)
MCHC RBC AUTO-ENTMCNC: 34.4 G/DL (ref 31.4–37.4)
MCV RBC AUTO: 89 FL (ref 82–98)
MONOCYTES # BLD AUTO: 0.32 THOUSAND/ÂΜL (ref 0.17–1.22)
MONOCYTES NFR BLD AUTO: 5 % (ref 4–12)
NEUTROPHILS # BLD AUTO: 4.66 THOUSANDS/ÂΜL (ref 1.85–7.62)
NEUTS SEG NFR BLD AUTO: 71 % (ref 43–75)
NITRITE UR QL STRIP: NEGATIVE
NON-SQ EPI CELLS URNS QL MICRO: ABNORMAL /HPF
NRBC BLD AUTO-RTO: 0 /100 WBCS
PH UR STRIP.AUTO: 5 [PH]
PLATELET # BLD AUTO: 260 THOUSANDS/UL (ref 149–390)
PMV BLD AUTO: 9.1 FL (ref 8.9–12.7)
POTASSIUM SERPL-SCNC: 3.6 MMOL/L (ref 3.5–5.3)
PROT SERPL-MCNC: 6.6 G/DL (ref 6.4–8.4)
PROT UR STRIP-MCNC: NEGATIVE MG/DL
RBC # BLD AUTO: 4.73 MILLION/UL (ref 3.81–5.12)
RBC #/AREA URNS AUTO: ABNORMAL /HPF
SODIUM SERPL-SCNC: 138 MMOL/L (ref 135–147)
SP GR UR STRIP.AUTO: 1.02 (ref 1–1.03)
UROBILINOGEN UR STRIP-ACNC: 2 MG/DL
WBC # BLD AUTO: 6.54 THOUSAND/UL (ref 4.31–10.16)
WBC #/AREA URNS AUTO: ABNORMAL /HPF

## 2024-05-13 PROCEDURE — 81001 URINALYSIS AUTO W/SCOPE: CPT | Performed by: EMERGENCY MEDICINE

## 2024-05-13 PROCEDURE — 96360 HYDRATION IV INFUSION INIT: CPT

## 2024-05-13 PROCEDURE — 99284 EMERGENCY DEPT VISIT MOD MDM: CPT

## 2024-05-13 PROCEDURE — 85025 COMPLETE CBC W/AUTO DIFF WBC: CPT | Performed by: EMERGENCY MEDICINE

## 2024-05-13 PROCEDURE — 80053 COMPREHEN METABOLIC PANEL: CPT | Performed by: EMERGENCY MEDICINE

## 2024-05-13 PROCEDURE — 74177 CT ABD & PELVIS W/CONTRAST: CPT

## 2024-05-13 PROCEDURE — 81025 URINE PREGNANCY TEST: CPT | Performed by: PHYSICIAN ASSISTANT

## 2024-05-13 PROCEDURE — 99285 EMERGENCY DEPT VISIT HI MDM: CPT | Performed by: PHYSICIAN ASSISTANT

## 2024-05-13 PROCEDURE — 36415 COLL VENOUS BLD VENIPUNCTURE: CPT

## 2024-05-13 PROCEDURE — 83690 ASSAY OF LIPASE: CPT | Performed by: EMERGENCY MEDICINE

## 2024-05-13 RX ADMIN — SODIUM CHLORIDE 1000 ML: 0.9 INJECTION, SOLUTION INTRAVENOUS at 15:42

## 2024-05-13 RX ADMIN — IOHEXOL 100 ML: 350 INJECTION, SOLUTION INTRAVENOUS at 16:49

## 2024-05-13 NOTE — ED PROVIDER NOTES
History  Chief Complaint   Patient presents with    Abdominal Pain     Left sided abdominal pain x 1 hr with vomiting and diarrhea. Currently taking Ozempic      44yo female with a history of GERD and IBS presenting with her  for evaluation of abdominal pain. She was cleaning about 3 hours ago when she had an abrupt onset of severe lower abdominal pain. The pain was associated with nausea and she vomited once. She also had 2 episodes of diarrhea. The pain has subsided currently but she continues to have intermittent sharp pain in her LLQ. She noticed blood when she wiped while in the waiting room. She is unsure if this was from her urethra or rectum. She denies any fevers. Of note, patient started Ozempic 1 month ago and last dose was 6 days ago. Previous abdominal surgeries include an appendectomy and liposuction.       History provided by:  Patient   used: No    Abdominal Pain  Associated symptoms: diarrhea, nausea and vomiting    Associated symptoms: no chest pain, no chills, no dysuria, no fever and no shortness of breath        Prior to Admission Medications   Prescriptions Last Dose Informant Patient Reported? Taking?   buPROPion (WELLBUTRIN XL) 300 mg 24 hr tablet   No No   Sig: TAKE 1 TABLET (300 MG TOTAL) BY MOUTH EVERY MORNING.   busPIRone (BUSPAR) 5 mg tablet   No No   Sig: TAKE 1 TABLET BY MOUTH TWICE A DAY   ciprofloxacin-dexamethasone (CIPRODEX) otic suspension   No No   Sig: Administer 4 drops into the left ear 2 (two) times a day for 5 days   dicyclomine (BENTYL) 20 mg tablet  Self No No   Sig: Take 1 tablet (20 mg total) by mouth every 6 (six) hours for 30 days   hydrOXYzine HCL (ATARAX) 25 mg tablet   No No   Sig: Take 1 tablet (25 mg total) by mouth daily at bedtime for 20 days   linaCLOtide (Linzess) 145 MCG CAPS   No No   Sig: TAKE 1 CAPSULE (145 MCG TOTAL) BY MOUTH EVERY MORNING   Patient not taking: Reported on 4/19/2024   pramipexole (MIRAPEX) 0.25 mg tablet  Self  No No   Sig: TAKE 1 TABLET BY MOUTH EVERYDAY AT BEDTIME   valACYclovir (VALTREX) 500 mg tablet  Self Yes No   Sig: TAKE 1 TABLET (500 MG TOTAL) BY MOUTH 2 (TWO) TIMES A DAY FOR 3 DAYS.   Patient not taking: Reported on 4/19/2024      Facility-Administered Medications: None       Past Medical History:   Diagnosis Date    GERD (gastroesophageal reflux disease)     Hair or hair follicle disorder     Irritable bowel syndrome        Past Surgical History:   Procedure Laterality Date    APPENDECTOMY      COLONOSCOPY      TOOTH EXTRACTION      [place]       Family History   Problem Relation Age of Onset    Ulcerative colitis Mother     Diverticulitis Father     Colon cancer Maternal Grandfather     Breast cancer Family         multiple family members    Ovarian cancer Other      I have reviewed and agree with the history as documented.    E-Cigarette/Vaping    E-Cigarette Use Never User      E-Cigarette/Vaping Substances    Nicotine No     THC No     CBD No     Flavoring No     Other No     Unknown No      Social History     Tobacco Use    Smoking status: Never    Smokeless tobacco: Never   Vaping Use    Vaping status: Never Used   Substance Use Topics    Alcohol use: No    Drug use: No       Review of Systems   Constitutional:  Negative for chills and fever.   HENT:  Negative for drooling and voice change.    Eyes:  Negative for discharge and redness.   Respiratory:  Negative for shortness of breath and stridor.    Cardiovascular:  Negative for chest pain and leg swelling.   Gastrointestinal:  Positive for abdominal pain, diarrhea, nausea and vomiting.   Genitourinary:  Negative for difficulty urinating and dysuria.   Musculoskeletal:  Negative for neck pain and neck stiffness.   Skin:  Negative for color change and rash.   Neurological:  Negative for seizures and syncope.   Psychiatric/Behavioral:  Negative for confusion. The patient is not nervous/anxious.    All other systems reviewed and are negative.      Physical  Exam  Physical Exam  Vitals and nursing note reviewed.   Constitutional:       General: She is not in acute distress.     Appearance: Normal appearance. She is not toxic-appearing.   HENT:      Head: Normocephalic and atraumatic.      Right Ear: External ear normal.      Left Ear: External ear normal.   Eyes:      General: No scleral icterus.        Right eye: No discharge.         Left eye: No discharge.      Conjunctiva/sclera: Conjunctivae normal.   Cardiovascular:      Rate and Rhythm: Normal rate.   Pulmonary:      Effort: Pulmonary effort is normal. No respiratory distress.      Breath sounds: No stridor.   Abdominal:      General: Abdomen is flat. There is no distension.      Tenderness: There is abdominal tenderness in the epigastric area, suprapubic area and left upper quadrant. There is no guarding or rebound.   Musculoskeletal:         General: No deformity. Normal range of motion.      Cervical back: Normal range of motion.   Skin:     General: Skin is warm and dry.   Neurological:      General: No focal deficit present.      Mental Status: She is alert. Mental status is at baseline.   Psychiatric:         Mood and Affect: Mood normal.         Behavior: Behavior normal.       Vital Signs  ED Triage Vitals   Temperature Pulse Respirations Blood Pressure SpO2   05/13/24 1345 05/13/24 1345 05/13/24 1345 05/13/24 1345 05/13/24 1345   97.8 °F (36.6 °C) 75 16 113/76 95 %      Temp Source Heart Rate Source Patient Position - Orthostatic VS BP Location FiO2 (%)   05/13/24 1345 05/13/24 1736 05/13/24 1345 05/13/24 1345 --   Temporal Monitor Sitting Left arm       Pain Score       05/13/24 1909       1           Vitals:    05/13/24 1345 05/13/24 1736 05/13/24 1909   BP: 113/76 115/62 118/65   Pulse: 75 73 72   Patient Position - Orthostatic VS: Sitting Lying Sitting         Visual Acuity      ED Medications  Medications   sodium chloride 0.9 % bolus 1,000 mL (0 mL Intravenous Stopped 5/13/24 1642)   iohexol  (OMNIPAQUE) 350 MG/ML injection (MULTI-DOSE) 100 mL (100 mL Intravenous Given 5/13/24 1649)       Diagnostic Studies  Results Reviewed       Procedure Component Value Units Date/Time    POCT pregnancy, urine [[id number]]  (Normal) Resulted: 05/13/24 1633    Lab Status: Final result Updated: 05/13/24 1633     EXT Preg Test, Ur Negative     Control Valid    Urine Microscopic [[id number]]  (Abnormal) Collected: 05/13/24 1542    Lab Status: Final result Specimen: Urine, Clean Catch Updated: 05/13/24 1617     RBC, UA 4-10 /hpf      WBC, UA 2-4 /hpf      Epithelial Cells Occasional /hpf      Bacteria, UA Occasional /hpf     UA w Reflex to Microscopic w Reflex to Culture [[id number]]  (Abnormal) Collected: 05/13/24 1542    Lab Status: Final result Specimen: Urine, Clean Catch Updated: 05/13/24 1601     Color, UA Yellow     Clarity, UA Clear     Specific Gravity, UA 1.022     pH, UA 5.0     Leukocytes, UA Trace     Nitrite, UA Negative     Protein, UA Negative mg/dl      Glucose, UA Negative mg/dl      Ketones, UA Trace mg/dl      Urobilinogen, UA 2.0 mg/dl      Bilirubin, UA Negative     Occult Blood, UA Small    Comprehensive metabolic panel [[id number]] Collected: 05/13/24 1351    Lab Status: Final result Specimen: Blood from Arm, Right Updated: 05/13/24 1423     Sodium 138 mmol/L      Potassium 3.6 mmol/L      Chloride 106 mmol/L      CO2 24 mmol/L      ANION GAP 8 mmol/L      BUN 12 mg/dL      Creatinine 0.79 mg/dL      Glucose 99 mg/dL      Calcium 9.0 mg/dL      AST 13 U/L      ALT 11 U/L      Alkaline Phosphatase 47 U/L      Total Protein 6.6 g/dL      Albumin 4.2 g/dL      Total Bilirubin 0.36 mg/dL      eGFR 93 ml/min/1.73sq m     Narrative:      National Kid[employer] Disease Foundation guidelines for Chronic Kidney Disease (CKD):     Stage 1 with normal or high GFR (GFR > 90 mL/min/1.73 square meters)    Stage 2 Mild CKD (GFR = 60-89 mL/min/1.73 square meters)    Stage 3A Moderate CKD (GFR = 45-59 mL/min/1.73 square  meters)    Stage 3B Moderate CKD (GFR = 30-44 mL/min/1.73 square meters)    Stage 4 Severe CKD (GFR = 15-29 mL/min/1.73 square meters)    Stage 5 End Stage CKD (GFR <15 mL/min/1.73 square meters)  Note: GFR calculation is accurate only with a steady state creatinine    Lipase [[id number]]  (Abnormal) Collected: 05/13/24 1351    Lab Status: Final result Specimen: Blood from Arm, Right Updated: 05/13/24 1423     Lipase 1,284 u/L     CBC and differential [[id number]] Collected: 05/13/24 1351    Lab Status: Final result Specimen: Blood from Arm, Right Updated: 05/13/24 1356     WBC 6.54 Thousand/uL      RBC 4.73 Million/uL      Hemoglobin 14.5 g/dL      Hematocrit 42.1 %      MCV 89 fL      MCH 30.7 pg      MCHC 34.4 g/dL      RDW 11.7 %      MPV 9.1 fL      Platelets 260 Thousands/uL      nRBC 0 /100 WBCs      Segmented % 71 %      Immature Grans % 0 %      Lymphocytes % 22 %      Monocytes % 5 %      Eosinophils Relative 2 %      Basophils Relative 0 %      Absolute Neutrophils 4.66 Thousands/µL      Absolute Immature Grans 0.01 Thousand/uL      Absolute Lymphocytes 1.42 Thousands/µL      Absolute Monocytes 0.32 Thousand/µL      Eosinophils Absolute 0.12 Thousand/µL      Basophils Absolute 0.01 Thousands/µL                    CT abdomen pelvis with contrast   Final Result by Emmanuelle Ferris MD (05/13 1821)      1) Findings suggestive of colitis in the descending and most of the transverse colon, with possible involvement of the ascending and proximal transverse colon as well. This may be infectious or inflammatory. No convincing small bowel inflammation.      2) Pancreas mildly enlarged diffusely compared to 2019 and with slight loss of the normal lobulation, suggestive of underlying edema. Given elevated lipase level, these findings indicate acute interstitial pancreatitis. No surrounding fat stranding or    fluid. No dilation of the main pancreatic duct. Given appearance recommend correlation with IgE levels to  exclude autoimmune pancreatitis.      3) Small amount of free fluid in the dependent portion of the pelvis, likely physiologic, or reactive.      4) Additional findings as above.      The study was marked in EPIC for immediate notification.            Workstation performed: [id number]                    Procedures  Procedures         ED Course  ED Course as of 05/13/24 2156   Mon May 13, 2024   1506 LIPASE(!): 1,284                     Medical Decision Making  43yoF here with acute abdominal pain. Began abruptly while cleaning this afternoon. Pain now improved but she continues to have intermittent LLQ pain. Recently started on Ozempic 1 month ago. She is afebrile and hemodynamically stable. She is well appearing in no distress. No signs of peritonitis on abdominal exam.    Initial ED plan: Abdominal labs checked in triage. Lipase is elevated at 1100. Remainder of labs unremarkable. Will add UA, Upreg, and CT abdomen. IV fluid bolus. She declines analgesics.    Final assessment: No signs of infection on urinalysis. Urine pregnancy test is negative. CT shows findings suggestive of colitis and acute interstitial pancreatitis. She has not required any pain medications during ED. No indication for admission. She denies any regular alcohol use and no cholelithiasis seen on CT. Suspect pancreatitis is 2/2 Ozempic. Advised clear liquid diet and increased fluid intake. Advised close PCP and GI follow-up. Strict ED return precautions discussed. Patient expressed understanding and is agreeable to plan. Patient discharged in stable condition.        Problems Addressed:  Acute pancreatitis: acute illness or injury    Amount and/or Complexity of Data Reviewed  Labs: ordered. Decision-making details documented in ED Course.  Radiology: ordered.    Risk  Prescription drug management.             Disposition  Final diagnoses:   Acute pancreatitis     Time reflects when diagnosis was documented in both MDM as applicable and the  Disposition within this note       Time User Action Codes Description Comment    5/13/2024  6:35 PM Casandra Gomez Add [K85.90] Acute pancreatitis           ED Disposition       ED Disposition   Discharge    Condition   Stable    Date/Time   Mon May 13, 2024  6:35 PM    Comment   Deisi Rivera discharge to home/self care.                   Follow-up Information       Follow up With Specialties Details Why Contact Info Additional Information    Daphney Bauer DO Family Medicine Schedule an appointment as soon as possible for a visit   [address] [facility] [address]  [phone]       Nicko Cordova MD Gastroenterology Schedule an appointment as soon as possible for a visit   [address] [facility]  [address]  [place] [facility] [address]  [phone]       [facility] Emergency Medicine  If symptoms worsen [address] [facility] [id number] [phone] [facility], [address] [place], [place], [address]            Discharge Medication List as of 5/13/2024  6:37 PM        CONTINUE these medications which have NOT CHANGED    Details   buPROPion (WELLBUTRIN XL) 300 mg 24 hr tablet TAKE 1 TABLET (300 MG TOTAL) BY MOUTH EVERY MORNING., Starting Sat 2/24/2024, Normal      busPIRone (BUSPAR) 5 mg tablet TAKE 1 TABLET BY MOUTH TWICE A DAY, Starting Sat 2/24/2024, Normal      ciprofloxacin-dexamethasone (CIPRODEX) otic suspension Administer 4 drops into the left ear 2 (two) times a day for 5 days, Starting Fri 4/19/2024, Until Wed 4/24/2024, Normal      dicyclomine (BENTYL) 20 mg tablet Take 1 tablet (20 mg total) by mouth every 6 (six) hours for 30 days, Starting Wed 2/6/2019, Until Thu 11/9/2023, Normal      hydrOXYzine HCL (ATARAX) 25 mg tablet Take 1 tablet (25 mg total) by mouth daily at bedtime for 20 days, Starting Fri 10/6/2023, Until Thu 11/9/2023, Normal      linaCLOtide  (Linzess) 145 MCG CAPS TAKE 1 CAPSULE (145 MCG TOTAL) BY MOUTH EVERY MORNING, Starting Wed 3/6/2024, Normal      pramipexole (MIRAPEX) 0.25 mg tablet TAKE 1 TABLET BY MOUTH EVERYDAY AT BEDTIME, Normal      valACYclovir (VALTREX) 500 mg tablet TAKE 1 TABLET (500 MG TOTAL) BY MOUTH 2 (TWO) TIMES A DAY FOR 3 DAYS., Historical Med             No discharge procedures on file.    PDMP Review       None            ED Provider  Electronically Signed by             Casandra Gomez PA-C  05/13/24 8480

## 2024-05-13 NOTE — DISCHARGE INSTRUCTIONS
Eat a clear liquid diet and drink plenty of fluids. Advance diet as tolerated. Take Tylenol and ibuprofen for pain.    Please follow-up with your family doctor and gastroenterology. Return to the ER with any worsening symptoms, uncontrolled pain, fevers.

## 2024-05-16 ENCOUNTER — OFFICE VISIT (OUTPATIENT)
Age: 44
End: 2024-05-16
Payer: COMMERCIAL

## 2024-05-16 VITALS
TEMPERATURE: 97.3 F | HEART RATE: 77 BPM | OXYGEN SATURATION: 98 % | BODY MASS INDEX: 23.39 KG/M2 | SYSTOLIC BLOOD PRESSURE: 112 MMHG | RESPIRATION RATE: 17 BRPM | WEIGHT: 140.4 LBS | HEIGHT: 65 IN | DIASTOLIC BLOOD PRESSURE: 68 MMHG

## 2024-05-16 DIAGNOSIS — R31.21 ASYMPTOMATIC MICROSCOPIC HEMATURIA: ICD-10-CM

## 2024-05-16 DIAGNOSIS — K85.00 IDIOPATHIC ACUTE PANCREATITIS WITHOUT INFECTION OR NECROSIS: Primary | ICD-10-CM

## 2024-05-16 DIAGNOSIS — H93.8X2 CONGESTION OF LEFT EAR: ICD-10-CM

## 2024-05-16 PROCEDURE — 99214 OFFICE O/P EST MOD 30 MIN: CPT | Performed by: FAMILY MEDICINE

## 2024-05-16 RX ORDER — FLUTICASONE PROPIONATE 50 MCG
1 SPRAY, SUSPENSION (ML) NASAL DAILY
Qty: 15.8 ML | Refills: 0 | Status: SHIPPED | OUTPATIENT
Start: 2024-05-16 | End: 2024-05-31

## 2024-05-16 RX ORDER — MULTIVITAMIN
1 TABLET ORAL DAILY
COMMUNITY

## 2024-05-16 RX ORDER — TRETINOIN 0.5 MG/G
CREAM TOPICAL
COMMUNITY
Start: 2024-03-01

## 2024-05-16 NOTE — PROGRESS NOTES
Ambulatory Visit  Name: Deisi Rivera      : 1980      MRN: [id number]  Encounter Provider: Daphney Bauer DO  Encounter Date: 2024   Encounter department: [facility] [place]    Assessment & Plan   1. Idiopathic acute pancreatitis without infection or necrosis- resolving. Will trend lipase one additional time. It was down trending by time of discharge.   -     Lipase; Future; Expected date: 2024  2. Asymptomatic microscopic hematuria- noted on UA during hospital visit. No bacturia. Will repeat.   -     Urinalysis with microscopic; Future  3. Congestion of left ear-2/2 eustation  tube dysfunction. Clear effusion noted behind TM. Will provide Flonase.          History of Present Illness     Pt presents for post hospital f/u  Was admitted on  at [facility] for abdominal pain. Found to have acute pancreatitis.   Supportive therapies were provided.   Since discharge pt feels better but still a little tired   Etiology thought ot be due to GLP1 agonist she was using, from online provider, for weight loss. She has since stop using the medication.         Past Medical History:   Diagnosis Date    GERD (gastroesophageal reflux disease)     Hair or hair follicle disorder     Irritable bowel syndrome      Past Surgical History:   Procedure Laterality Date    APPENDECTOMY      COLONOSCOPY      TOOTH EXTRACTION      [place]     Family History   Problem Relation Age of Onset    Ulcerative colitis Mother     Diverticulitis Father     Colon cancer Maternal Grandfather     Breast cancer Family         multiple family members    Ovarian cancer Other      Social History     Tobacco Use    Smoking status: Never    Smokeless tobacco: Never   Vaping Use    Vaping status: Never Used   Substance and Sexual Activity    Alcohol use: No    Drug use: No    Sexual activity: Yes     Birth control/protection: None     Current Outpatient Medications on File Prior to Visit   Medication Sig     "metroNIDAZOLE (METROCREAM) 0.75 % cream APPLY TO AFFECTED AREA ON FACE TWICE DAILY FOR ROSACEA    Multiple Vitamin (multivitamin) tablet Take 1 tablet by mouth daily    pramipexole (MIRAPEX) 0.25 mg tablet TAKE 1 TABLET BY MOUTH EVERYDAY AT BEDTIME    tretinoin (REFISSA) 0.05 % cream APPLY A THIN LAYER TO ALL ACNE/SCARING AREAS AT BEDTIME    ciprofloxacin-dexamethasone (CIPRODEX) otic suspension Administer 4 drops into the left ear 2 (two) times a day for 5 days (Patient not taking: Reported on 5/16/2024)    linaCLOtide (Linzess) 145 MCG CAPS TAKE 1 CAPSULE (145 MCG TOTAL) BY MOUTH EVERY MORNING (Patient not taking: Reported on 4/19/2024)    valACYclovir (VALTREX) 500 mg tablet TAKE 1 TABLET (500 MG TOTAL) BY MOUTH 2 (TWO) TIMES A DAY FOR 3 DAYS. (Patient not taking: Reported on 4/19/2024)     No Known Allergies  Immunization History   Administered Date(s) Administered    COVID-19 PFIZER VACCINE 0.3 ML IM 04/16/2021, 04/16/2021, 05/07/2021, 12/14/2021, 12/14/2021    COVID-19 Pfizer Vac BIVALENT Arcadio-sucrose 12 Yr+ IM 11/09/2022    INFLUENZA 10/12/2018    Influenza, injectable, quadrivalent, preservative free 0.5 mL 10/12/2018, 09/18/2019, 10/13/2022    Influenza, seasonal, injectable, preservative free 01/30/2014, 10/12/2016    Tdap 05/21/2014, 09/26/2016    Tuberculin Skin Test-PPD Intradermal 09/18/2019     Objective     /68 (BP Location: Left arm, Patient Position: Sitting, Cuff Size: Standard)   Pulse 77   Temp (!) 97.3 °F (36.3 °C) (Tympanic)   Resp 17   Ht 5' 5\" (1.651 m)   Wt 63.7 kg (140 lb 6.4 oz)   LMP 05/04/2024 (Exact Date)   SpO2 98%   BMI 23.36 kg/m²     Physical Exam  HENT:      Head: Normocephalic.      Right Ear: External ear normal.      Left Ear: External ear normal. A middle ear effusion is present.   Eyes:      Conjunctiva/sclera: Conjunctivae normal.      Pupils: Pupils are equal, round, and reactive to light.   Cardiovascular:      Rate and Rhythm: Normal rate and regular rhythm. "   Pulmonary:      Effort: Pulmonary effort is normal.      Breath sounds: Normal breath sounds.   Abdominal:      General: Bowel sounds are normal.      Palpations: Abdomen is soft.      Tenderness: There is no abdominal tenderness.   Neurological:      Mental Status: She is alert and oriented to person, place, and time.      Gait: Gait normal.   Psychiatric:         Mood and Affect: Mood normal.         Behavior: Behavior normal.       Administrative Statements

## 2024-05-30 DIAGNOSIS — H93.8X2 CONGESTION OF LEFT EAR: ICD-10-CM

## 2024-05-31 DIAGNOSIS — F41.9 ANXIETY AND DEPRESSION: ICD-10-CM

## 2024-05-31 DIAGNOSIS — F32.A ANXIETY AND DEPRESSION: ICD-10-CM

## 2024-05-31 RX ORDER — BUPROPION HYDROCHLORIDE 300 MG/1
300 TABLET ORAL EVERY MORNING
Qty: 90 TABLET | Refills: 1 | Status: SHIPPED | OUTPATIENT
Start: 2024-05-31

## 2024-05-31 RX ORDER — BUSPIRONE HYDROCHLORIDE 5 MG/1
5 TABLET ORAL 2 TIMES DAILY
Qty: 180 TABLET | Refills: 1 | Status: SHIPPED | OUTPATIENT
Start: 2024-05-31

## 2024-05-31 RX ORDER — FLUTICASONE PROPIONATE 50 MCG
SPRAY, SUSPENSION (ML) NASAL
Qty: 24 ML | Refills: 1 | Status: SHIPPED | OUTPATIENT
Start: 2024-05-31

## 2024-06-24 DIAGNOSIS — Z00.6 ENCOUNTER FOR EXAMINATION FOR NORMAL COMPARISON OR CONTROL IN CLINICAL RESEARCH PROGRAM: ICD-10-CM

## 2024-10-01 DIAGNOSIS — F32.A ANXIETY AND DEPRESSION: ICD-10-CM

## 2024-10-01 DIAGNOSIS — F41.9 ANXIETY AND DEPRESSION: ICD-10-CM

## 2024-10-01 RX ORDER — BUPROPION HYDROCHLORIDE 300 MG/1
300 TABLET ORAL EVERY MORNING
Qty: 90 TABLET | Refills: 0 | Status: SHIPPED | OUTPATIENT
Start: 2024-10-01

## 2024-11-04 DIAGNOSIS — G25.81 RESTLESS LEG: ICD-10-CM

## 2024-11-04 RX ORDER — PRAMIPEXOLE DIHYDROCHLORIDE 0.25 MG/1
0.25 TABLET ORAL
Qty: 90 TABLET | Refills: 0 | Status: SHIPPED | OUTPATIENT
Start: 2024-11-04

## 2024-11-04 NOTE — TELEPHONE ENCOUNTER
Medication: Mirapex 0.25mg tablet    Dose/Frequency: Take 1 tablet by mouth everyday at bedtime    Quantity: 90    Pharmacy: [facility]    Office:   [] PCP/Provider -   [x] Speciality/Provider - Dr. Kasi Llanes    Does the patient have enough for 3 days?   [x] Yes   [] No - Send as HP to POD

## 2024-12-12 ENCOUNTER — OFFICE VISIT (OUTPATIENT)
Age: 44
End: 2024-12-12
Payer: COMMERCIAL

## 2024-12-12 VITALS
DIASTOLIC BLOOD PRESSURE: 68 MMHG | HEART RATE: 76 BPM | HEIGHT: 65 IN | SYSTOLIC BLOOD PRESSURE: 113 MMHG | WEIGHT: 149.6 LBS | RESPIRATION RATE: 14 BRPM | OXYGEN SATURATION: 99 % | BODY MASS INDEX: 24.93 KG/M2 | TEMPERATURE: 97.6 F

## 2024-12-12 DIAGNOSIS — Z13.220 ENCOUNTER FOR LIPID SCREENING FOR CARDIOVASCULAR DISEASE: ICD-10-CM

## 2024-12-12 DIAGNOSIS — Z00.00 ANNUAL PHYSICAL EXAM: Primary | ICD-10-CM

## 2024-12-12 DIAGNOSIS — N92.6 MISSED PERIOD: ICD-10-CM

## 2024-12-12 DIAGNOSIS — F41.9 ANXIETY AND DEPRESSION: ICD-10-CM

## 2024-12-12 DIAGNOSIS — E78.5 DYSLIPIDEMIA: ICD-10-CM

## 2024-12-12 DIAGNOSIS — G25.81 RESTLESS LEG: ICD-10-CM

## 2024-12-12 DIAGNOSIS — E55.9 VITAMIN D INSUFFICIENCY: ICD-10-CM

## 2024-12-12 DIAGNOSIS — F32.A ANXIETY AND DEPRESSION: ICD-10-CM

## 2024-12-12 DIAGNOSIS — Z13.6 ENCOUNTER FOR LIPID SCREENING FOR CARDIOVASCULAR DISEASE: ICD-10-CM

## 2024-12-12 PROBLEM — R14.0 BLOATING: Status: RESOLVED | Noted: 2019-02-06 | Resolved: 2024-12-12

## 2024-12-12 PROBLEM — R10.84 GENERALIZED ABDOMINAL PAIN: Status: RESOLVED | Noted: 2019-02-06 | Resolved: 2024-12-12

## 2024-12-12 PROBLEM — R09.A2 GLOBUS SENSATION: Status: RESOLVED | Noted: 2021-05-03 | Resolved: 2024-12-12

## 2024-12-12 PROCEDURE — 99214 OFFICE O/P EST MOD 30 MIN: CPT | Performed by: FAMILY MEDICINE

## 2024-12-12 PROCEDURE — 99396 PREV VISIT EST AGE 40-64: CPT | Performed by: FAMILY MEDICINE

## 2024-12-12 NOTE — PROGRESS NOTES
[facility]  Annual Physical & Office Visit     PATIENT INFORMATION   Name: Deisi Rivera   YOB: 1980   MRN: [id number]  Encounter Provider: Mejia Coombs MD    Encounter Date: 12/12/2024    ASSESSMENT & PLAN     Assessment & Plan  Annual physical exam  Healthcare Maintenance  Health maintenance completed today  - Medical history reviewed, including existing medical conditions, medications, and surgeries.   - Labs discussed to evaluate cholesterol, blood sugar, kidney function, liver function, and other important markers of health.  - BMI evaluated and discussed.  - Lifestyle and health counseling completed including diet, exercise habits, smoking status, alcohol consumption.   - Bone & Heart health reviewed  - Cancer screenings discussed: Mammogram/Pap smear/CT lung/colonoscopy.   - Vaccination status reviewed and pertinent immunizations and booster shots discussed.  - Skin examination: Discussed importance of sunscreen and other preventative measures for skin cancer.  - Mental health and wellbeing evaluated and discussed.  - Family history obtained to identify any of hereditary health risks.  Lab orders in place as discussed  Start/continue preventative measures as discussed/advised  Complete preventative orders in place as recommended.   Refer to screenings problem list   Orders:  •  Comprehensive metabolic panel  •  CBC and differential    Anxiety and depression  Currently prescribed BuSpar 5 mg twice daily, Wellbutrin  mg daily  Reports: Reports taking as prescribed. Reports taking differently, BuSpar as needed.   Assessment: Uncontrolled  Med changes: None. Continue current regimen.    Advised on the importance of building and maintaining good coping mechanisms with recommendations as discussed including  Yoga, recommend 3-4x/week  Breath-work/Meditation  Journalling, consistently.   Exercising/walking consistently.  Getting good quality sleep.  Return for annual  physical in one year.    Orders:  •  TSH, 3rd generation with Free T4 reflex; Future    Dyslipidemia  Reviewed general recommendations. Due for updated labs. Complete as discussed.       Vitamin D insufficiency    Orders:  •  Vitamin D 25 hydroxy    Encounter for lipid screening for cardiovascular disease    Orders:  •  Lipid Panel with Direct LDL reflex    Restless leg  Currently taking pramipexole as needed for restless leg syndrome.       Missed period  Concerned about pregnancy  Orders:  •  hCG, quantitative; Future       COUNSELING    Alcohol/drug use: discussed moderation in alcohol intake, the recommendations for healthy alcohol use, and avoidance of illicit drug use.  Dental Health: discussed importance of regular tooth brushing, flossing, and dental visits.  Injury prevention: discussed safety/seat belts, safety helmets, smoke detectors, carbon monoxide detectors, and smoking near bedding or upholstery.  Sexual health: discussed sexually transmitted diseases, partner selection, use of condoms, avoidance of unintended pregnancy, and contraceptive alternatives.  Exercise: the importance of regular exercise/physical activity was discussed. Recommend exercise 3-5 times per week for at least 30 minutes.      HEALTH MAINTENANCE     Immunization History   Administered Date(s) Administered   • COVID-19 PFIZER VACCINE 0.3 ML IM 04/16/2021, 04/16/2021, 05/07/2021, 12/14/2021, 12/14/2021   • COVID-19 Pfizer Vac BIVALENT Arcadio-sucrose 12 Yr+ IM 11/09/2022   • INFLUENZA 10/12/2018   • Influenza, injectable, quadrivalent, preservative free 0.5 mL 10/12/2018, 09/18/2019, 10/13/2022   • Influenza, seasonal, injectable, preservative free 01/30/2014, 10/12/2016   • Tdap 05/21/2014, 09/26/2016   • Tuberculin Skin Test-PPD Intradermal 09/18/2019     Pap smear:01/09/2020  Mammogram:08/02/2023   Colonoscopy:11/09/2023 11/09/2023  Cologuard:Not on file   DEXA scan:Not on file    FOLLOW UP   No follow-ups on file.    CURRENT  MEDICATIONS     Current Outpatient Medications:   •  buPROPion (WELLBUTRIN XL) 300 mg 24 hr tablet, Take 1 tablet (300 mg total) by mouth every morning, Disp: 90 tablet, Rfl: 0  •  busPIRone (BUSPAR) 5 mg tablet, TAKE 1 TABLET BY MOUTH TWICE A DAY, Disp: 180 tablet, Rfl: 1  •  fluticasone (FLONASE) 50 mcg/act nasal spray, SPRAY 1 SPRAY INTO EACH NOSTRIL EVERY DAY, Disp: 24 mL, Rfl: 1  •  linaCLOtide (Linzess) 145 MCG CAPS, TAKE 1 CAPSULE (145 MCG TOTAL) BY MOUTH EVERY MORNING, Disp: 30 capsule, Rfl: 5  •  metroNIDAZOLE (METROCREAM) 0.75 % cream, APPLY TO AFFECTED AREA ON FACE TWICE DAILY FOR ROSACEA, Disp: , Rfl:   •  Multiple Vitamin (multivitamin) tablet, Take 1 tablet by mouth daily, Disp: , Rfl:   •  pramipexole (MIRAPEX) 0.25 mg tablet, Take 1 tablet (0.25 mg total) by mouth daily at bedtime, Disp: 90 tablet, Rfl: 0  •  tretinoin (REFISSA) 0.05 % cream, APPLY A THIN LAYER TO ALL ACNE/SCARING AREAS AT BEDTIME, Disp: , Rfl:   •  valACYclovir (VALTREX) 500 mg tablet, , Disp: , Rfl: 3    ANNUAL PHYSICAL QUESTIONNAIRE    History of Present Illness     Deisi ANTONINO Rivera is a 44 y.o. who is here for annual physical exam.  History obtained from : patient  HPI    Concerns today: no    LIFESTYLE  Nutritional intake: well balanced diet  Plain water hydration: fair  Exercise and Movement: 3-4 times a week on average and 5-7 times a week on average  Do you struggle with weight?  yes  Sleep: gets 4-6 hours of sleep on average and gets 7-8 hours of sleep on average     MENTAL HEALTH  PHQ-2/9 Depression Screening         Anxiety: yes  History of SI/SH: no  Significant past trauma that has impacted patient's mental health: yes  Coping mechanisms with life's worries and obstacles: yes    VISION, HEARING, DENTAL HEALTH  Hearing: normal - bilateral  Vision: goes for regular eye exams, wears glasses, and wears contacts  Dental: regular dental visits and brushes teeth twice daily    SUBSTANCE USE  Smoking Cig: non-smoker  Vaping:  no  Current use of recreational drugs: no   History of substance use: no   Alcohol consumption: Yes Moderate    WOMEN'S HEALTH  Follow gynecology: yes  Menstrual cycles: yes; current menstrual pattern: regular  Sexually active: Yes - single partner - male  Do you have any problems with incontinence? yes urine leakage with coughing/heavy physical activity. Follows uro.     FAMILY HISTORY, RISK FACTORS  Do you have any health concerns based on your family history or your risk factors? no    PATIENT AWARENESS   Do you know what medications you are on and why? yes  Do you check your blood pressures at home? yes  Do you try your best to follow-up with your health screenings? yes  Do you track your health metrics? yes    QUALITY OF LIFE   Do you have hobbies you enjoy? yes  Do you have a support system? yes  Do you work on reducing your stress levels? yes      Review of Systems   Constitutional:  Negative for chills, fever and unexpected weight change.   Respiratory:  Negative for chest tightness and shortness of breath.    Cardiovascular:  Negative for chest pain.   Gastrointestinal:  Negative for abdominal pain, constipation, diarrhea, nausea and vomiting.   Endocrine: Negative for polydipsia and polyuria.   Genitourinary:  Negative for dysuria and hematuria.   Neurological:  Negative for dizziness, weakness, light-headedness and headaches.       ALLERGIES    No Known Allergies    PAST MEDICAL & SURGICAL HISTORY      Past Medical History:   Diagnosis Date   • GERD (gastroesophageal reflux disease)    • Hair or hair follicle disorder    • Irritable bowel syndrome      Past Surgical History:   Procedure Laterality Date   • APPENDECTOMY     • COLONOSCOPY     • TOOTH EXTRACTION      [place]       FAMILY HISTORY & SOCIAL HISTORY      Family History   Problem Relation Age of Onset   • Ulcerative colitis Mother    • Diverticulitis Father    • Colon cancer Maternal Grandfather    • Breast cancer Family         multiple family  "members   • Ovarian cancer Other       Social History     Tobacco Use   • Smoking status: Never     Passive exposure: Never   • Smokeless tobacco: Never   Vaping Use   • Vaping status: Never Used   Substance and Sexual Activity   • Alcohol use: No   • Drug use: No   • Sexual activity: Yes     Birth control/protection: None        OBJECTIVES      /68 (BP Location: Left arm, Patient Position: Sitting, Cuff Size: Standard)   Pulse 76   Temp 97.6 °F (36.4 °C) (Tympanic)   Resp 14   Ht 5' 5\" (1.651 m)   Wt 67.9 kg (149 lb 9.6 oz)   SpO2 99%   BMI 24.89 kg/m²    Physical Exam  Vitals reviewed.   Constitutional:       General: She is not in acute distress.     Appearance: Normal appearance. She is not ill-appearing, toxic-appearing or diaphoretic.   HENT:      Head: Normocephalic and atraumatic.      Right Ear: External ear normal.      Left Ear: External ear normal.      Nose: Nose normal.      Mouth/Throat:      Mouth: Mucous membranes are moist.   Eyes:      General: No scleral icterus.        Right eye: No discharge.         Left eye: No discharge.      Extraocular Movements: Extraocular movements intact.      Conjunctiva/sclera: Conjunctivae normal.   Cardiovascular:      Rate and Rhythm: Normal rate and regular rhythm.      Pulses: Normal pulses.      Heart sounds: Normal heart sounds.   Pulmonary:      Effort: Pulmonary effort is normal. No respiratory distress.      Breath sounds: Normal breath sounds.   Abdominal:      Palpations: Abdomen is soft.      Tenderness: There is no abdominal tenderness.   Musculoskeletal:         General: No swelling. Normal range of motion.      Cervical back: Normal range of motion.   Skin:     General: Skin is warm and dry.   Neurological:      General: No focal deficit present.      Mental Status: She is alert and oriented to person, place, and time.   Psychiatric:         Mood and Affect: Mood normal.         Behavior: Behavior normal.         Thought Content: Thought " content normal.           Mejia Coombs MD  Family Medicine Physician   [facility] [place]      Administrative Statements     Medications have been reviewed by provider in current encounter

## 2024-12-12 NOTE — PATIENT INSTRUCTIONS
Together as a team our goal is to practice preventative care, avoid chronic diseases, and/or avoid further progression of current chronic diseases.   Here are my recommendations as we discussed during our office visit:    Exercise:  150 minutes of moderate intensity workout for overall general health  200-300 minutes of moderate intensity workout for weight loss.   The first 30 minutes of exercising, the body will take energy from what we ate that day. Then after that 30-minute romana, the body will take energy from the stored fats.  Therefore, it will be more beneficial to do longer sessions and less frequently in a week to help with our weight loss journey.  I would recommend 60-minute sessions 4 times a week   Strength training 2 times a week for good bone health    Nutritional intake (diet):  Remember, it is not about finding a diet to lose weight quickly, rather adjusting your lifestyle for healthy living long-term.   When we build good habits, it does not become difficult to maintain it.  Focus on a low-carb diet.  The best diet is Mediterranean diet, which is a low-carb diet.  There are good carbs and bad carbs, minimize the bad carbs.    Bad carbs: Refined carbohydrates or simple carbohydrates that have been processed and stripped of their natural fiber and nutrients.          These carbohydrates can lead to rapid spikes in blood sugar levels and are often associated with low nutritional value. The big 4: Bread, Rice, Pasta, Potatoes  Refined grains-white bread, white rice, pasta made from refined flour.  Sugary foods and beverages: Candy, pastries, sugary cereals, soda, and other sugary drinks.  Processed snacks: Chips, cookies, sugary granola bars  Sweetened breakfast cereals: Cereals with added sugars.  Sweets and desserts: Cakes, ice cream, pies  Good carbs: These are referred to complex carbohydrates that are unprocessed or minimally processed, providing more nutrients.  Here examples of good  carbs:  Whole grains: Brown rice, quinoa, oats, whole-wheat products   Legumes: Lentils, chickpeas, black beans, kidney beans  Starchy vegetables: Sweet potatoes, butternut squash  Whole fruits: Berries, apples, oranges, bananas  Vegetables: Broccoli, spinach, kale, [place] sprouts  Nuts and seeds: Almonds, walnuts, Shawn seeds, flaxseeds.    Focus on eating whole foods.  What are whole foods?  Whole Foods refer to natural, unprocessed, or minimally processed foods that are as close to the original form as possible.  These foods are in their natural state and have undergone little to no refined or alteration.  Whole Foods are valued for their nutrient density, providing essential vitamins, minerals, fiber, and other beneficial compounds.  Examples of whole foods include:  Fruits and vegetables without added preservatives or processing.    Whole grains-unrefined grains like brown rice, quinoa, and whole-wheat, which retain their brain, germ, and endosperm.  Legumes-beans, lentils, and peas in their national unprocessed date.  Nuts and seeds-on roasted, unsalted nuts and seeds without added oils or seasonings.  Meat and fish-fresh, unprocessed meats and fish without additives or preservatives.  Dairy-unprocessed dairy products such as milk, yogurt, and cheese without added sugars or artificial ingredients.  Eggs-eggs in their natural form without additives.    Protein requirement  Calculate your protein requirement in grams by multiplying your weight in kilograms by the recommended protein intake per kilogram.  This gives you an estimate of your daily protein requirement.  Age 15-18: 0.9 grams of protein per kilogram of body weight from male gender, 0.9 g of protein per kilogram of body weight for female gender.  Age 19+: 0.8 g of protein per kilogram of body weight for both male and female gender.  If you are physically active or trying to build muscle: 1.2-2.2 g/kg  Example: if you weigh 70 kg, to calculate your  protein intake per day multiply 70 by 0.8 = 56 grams per day  To convert your weight to kilograms from pounds: Take your weight in pounds and divided by 2.2046 to get your weight to kilograms.    Sodium/salt  Compare sodium in foods like soup, bread, frozen and packaged meals, then choose the one with lower numbers.  Most Americans should limit their sodium intake to less than 2,300 mg/day.  All -Americans, people with diabetes, high blood pressure, kidney disease, should limit their sodium intake to 1,500 mg/day.    Cooking oil  Avoid the following oil for cooking: Canola, corn, vegetable, sunflower, peanut, sesame, grapeseed, flaxseed.  Even though it states it is heart healthy, however, they are significantly processed and refined.   Would recommend instead the following cooking oil: Olive oil, coconut oil, avocado oil, ghee, butter.    Intermittent fasting:   There are several benefits of intermittent fasting including weight loss, improving insulin sensitivity, improving cardiovascular health by reducing risk factors like blood pressure, cholesterol levels, and triglycerides. It also promotes brain health, longevity, reduction in inflammatory markers, improves metabolic health, and some studies even suggest intermittent fasting to be protective against certain types of cancers.     The goal of intermittent fasting is to shutdown the insulin hormone, as we discussed, which is a hormone that negatively affects our health when it is around in high levels chronically.     Start intermittent fasting for 14 hours initially, then increase to 16 hours, with a goal to reach 18 hours.  During fasting - may drink water without any additives such as sweeteners, black coffee, tea without any additives including milk.   Eat nutritious meals 2 times a day  Avoid snacking in between meals.  If you end up snacking, snack on fruits/vegetables.  Initially it might be difficult, however, over time you will notice a positive  change in your energy, mood, and weight.

## 2024-12-12 NOTE — ASSESSMENT & PLAN NOTE
Currently prescribed BuSpar 5 mg twice daily, Wellbutrin  mg daily  Reports: Reports taking as prescribed. Reports taking differently, BuSpar as needed.   Assessment: Uncontrolled  Med changes: None. Continue current regimen.    Advised on the importance of building and maintaining good coping mechanisms with recommendations as discussed including  Yoga, recommend 3-4x/week  Breath-work/Meditation  Journalling, consistently.   Exercising/walking consistently.  Getting good quality sleep.  Return for annual physical in one year.    Orders:  •  TSH, 3rd generation with Free T4 reflex; Future

## 2025-01-07 ENCOUNTER — APPOINTMENT (OUTPATIENT)
Age: 45
End: 2025-01-07
Payer: COMMERCIAL

## 2025-01-07 DIAGNOSIS — N92.6 MISSED PERIOD: ICD-10-CM

## 2025-01-07 DIAGNOSIS — F41.9 ANXIETY AND DEPRESSION: ICD-10-CM

## 2025-01-07 DIAGNOSIS — F32.A ANXIETY AND DEPRESSION: ICD-10-CM

## 2025-01-07 LAB
25(OH)D3 SERPL-MCNC: 43.5 NG/ML (ref 30–100)
ALBUMIN SERPL BCG-MCNC: 4.3 G/DL (ref 3.5–5)
ALP SERPL-CCNC: 44 U/L (ref 34–104)
ALT SERPL W P-5'-P-CCNC: 8 U/L (ref 7–52)
ANION GAP SERPL CALCULATED.3IONS-SCNC: 8 MMOL/L (ref 4–13)
AST SERPL W P-5'-P-CCNC: 15 U/L (ref 13–39)
B-HCG SERPL-ACNC: <0.6 MIU/ML (ref 0–5)
BASOPHILS # BLD AUTO: 0.02 THOUSANDS/ΜL (ref 0–0.1)
BASOPHILS NFR BLD AUTO: 0 % (ref 0–1)
BILIRUB SERPL-MCNC: 0.56 MG/DL (ref 0.2–1)
BUN SERPL-MCNC: 17 MG/DL (ref 5–25)
CALCIUM SERPL-MCNC: 8.9 MG/DL (ref 8.4–10.2)
CHLORIDE SERPL-SCNC: 106 MMOL/L (ref 96–108)
CHOLEST SERPL-MCNC: 191 MG/DL (ref ?–200)
CO2 SERPL-SCNC: 25 MMOL/L (ref 21–32)
CREAT SERPL-MCNC: 0.75 MG/DL (ref 0.6–1.3)
EOSINOPHIL # BLD AUTO: 0.14 THOUSAND/ΜL (ref 0–0.61)
EOSINOPHIL NFR BLD AUTO: 2 % (ref 0–6)
ERYTHROCYTE [DISTWIDTH] IN BLOOD BY AUTOMATED COUNT: 11.7 % (ref 11.6–15.1)
GFR SERPL CREATININE-BSD FRML MDRD: 98 ML/MIN/1.73SQ M
GLUCOSE P FAST SERPL-MCNC: 92 MG/DL (ref 65–99)
HCT VFR BLD AUTO: 41 % (ref 34.8–46.1)
HDLC SERPL-MCNC: 63 MG/DL
HGB BLD-MCNC: 14 G/DL (ref 11.5–15.4)
IMM GRANULOCYTES # BLD AUTO: 0.02 THOUSAND/UL (ref 0–0.2)
IMM GRANULOCYTES NFR BLD AUTO: 0 % (ref 0–2)
LDLC SERPL CALC-MCNC: 110 MG/DL (ref 0–100)
LYMPHOCYTES # BLD AUTO: 1.45 THOUSANDS/ΜL (ref 0.6–4.47)
LYMPHOCYTES NFR BLD AUTO: 25 % (ref 14–44)
MCH RBC QN AUTO: 31 PG (ref 26.8–34.3)
MCHC RBC AUTO-ENTMCNC: 34.1 G/DL (ref 31.4–37.4)
MCV RBC AUTO: 91 FL (ref 82–98)
MONOCYTES # BLD AUTO: 0.38 THOUSAND/ΜL (ref 0.17–1.22)
MONOCYTES NFR BLD AUTO: 7 % (ref 4–12)
NEUTROPHILS # BLD AUTO: 3.72 THOUSANDS/ΜL (ref 1.85–7.62)
NEUTS SEG NFR BLD AUTO: 66 % (ref 43–75)
NRBC BLD AUTO-RTO: 0 /100 WBCS
PLATELET # BLD AUTO: 234 THOUSANDS/UL (ref 149–390)
PMV BLD AUTO: 10.3 FL (ref 8.9–12.7)
POTASSIUM SERPL-SCNC: 3.9 MMOL/L (ref 3.5–5.3)
PROT SERPL-MCNC: 6.9 G/DL (ref 6.4–8.4)
RBC # BLD AUTO: 4.52 MILLION/UL (ref 3.81–5.12)
SODIUM SERPL-SCNC: 139 MMOL/L (ref 135–147)
TRIGL SERPL-MCNC: 91 MG/DL (ref ?–150)
TSH SERPL DL<=0.05 MIU/L-ACNC: 2.63 UIU/ML (ref 0.45–4.5)
WBC # BLD AUTO: 5.73 THOUSAND/UL (ref 4.31–10.16)

## 2025-01-07 PROCEDURE — 84443 ASSAY THYROID STIM HORMONE: CPT

## 2025-01-07 PROCEDURE — 84702 CHORIONIC GONADOTROPIN TEST: CPT

## 2025-01-08 ENCOUNTER — RESULTS FOLLOW-UP (OUTPATIENT)
Age: 45
End: 2025-01-08

## 2025-02-01 DIAGNOSIS — G25.81 RESTLESS LEG: ICD-10-CM

## 2025-02-02 RX ORDER — PRAMIPEXOLE DIHYDROCHLORIDE 0.25 MG/1
0.25 TABLET ORAL
Qty: 90 TABLET | Refills: 0 | Status: SHIPPED | OUTPATIENT
Start: 2025-02-02

## 2025-02-07 ENCOUNTER — TELEPHONE (OUTPATIENT)
Age: 45
End: 2025-02-07

## 2025-02-07 DIAGNOSIS — Z11.1 ENCOUNTER FOR PPD TEST: Primary | ICD-10-CM

## 2025-02-07 NOTE — TELEPHONE ENCOUNTER
Patient is scheduled for PPD placement on Monday, 2/10/25 and needs an order placed.  She is also bringing forms that need to be filled out for the physical she had done by Dr. Coombs in December.  She is aware staff will contact her once forms are completed.

## 2025-02-10 ENCOUNTER — CLINICAL SUPPORT (OUTPATIENT)
Age: 45
End: 2025-02-10
Payer: COMMERCIAL

## 2025-02-10 ENCOUNTER — TELEPHONE (OUTPATIENT)
Age: 45
End: 2025-02-10

## 2025-02-10 DIAGNOSIS — Z11.1 ENCOUNTER FOR PPD TEST: Primary | ICD-10-CM

## 2025-02-10 DIAGNOSIS — Z01.84 IMMUNITY STATUS TESTING: Primary | ICD-10-CM

## 2025-02-10 PROCEDURE — 86580 TB INTRADERMAL TEST: CPT

## 2025-02-13 ENCOUNTER — APPOINTMENT (OUTPATIENT)
Age: 45
End: 2025-02-13
Payer: COMMERCIAL

## 2025-02-13 DIAGNOSIS — Z01.84 IMMUNITY STATUS TESTING: Primary | ICD-10-CM

## 2025-02-13 LAB
INDURATION: 0 MM
TB SKIN TEST: NEGATIVE

## 2025-02-13 PROCEDURE — 86787 VARICELLA-ZOSTER ANTIBODY: CPT | Performed by: FAMILY MEDICINE

## 2025-02-13 PROCEDURE — 86704 HEP B CORE ANTIBODY TOTAL: CPT

## 2025-02-13 PROCEDURE — 36415 COLL VENOUS BLD VENIPUNCTURE: CPT | Performed by: FAMILY MEDICINE

## 2025-02-13 PROCEDURE — 86765 RUBEOLA ANTIBODY: CPT | Performed by: FAMILY MEDICINE

## 2025-02-13 PROCEDURE — 86735 MUMPS ANTIBODY: CPT | Performed by: FAMILY MEDICINE

## 2025-02-13 PROCEDURE — 86762 RUBELLA ANTIBODY: CPT | Performed by: FAMILY MEDICINE

## 2025-02-13 PROCEDURE — 86706 HEP B SURFACE ANTIBODY: CPT

## 2025-02-14 LAB
HBV CORE AB SER QL: NORMAL
HBV SURFACE AB SER-ACNC: 96.8 MIU/ML
VZV IGG SER QL IA: 9.79 S/CO
VZV IGG SER QL IA: POSITIVE

## 2025-02-15 LAB
MEV IGG SER IA-ACNC: <13.5 AU/ML
MUV IGG SER IA-ACNC: <9 AU/ML
RUBV IGG SERPL IA-ACNC: 1.58 INDEX

## 2025-02-17 ENCOUNTER — RESULTS FOLLOW-UP (OUTPATIENT)
Age: 45
End: 2025-02-17

## 2025-02-17 DIAGNOSIS — Z23 ENCOUNTER FOR IMMUNIZATION: Primary | ICD-10-CM

## 2025-02-17 NOTE — TELEPHONE ENCOUNTER
----- Message from Mejia Coombs MD sent at 2/17/2025  8:35 AM EST -----  Please let patient know there are low antibodies which could be from waning of vaccine immunity.  A booster MMR vaccine is recommended to sign off form. It cannot be given if pregnant. I have placed the order for the vaccine for patient receive during a schedule for nurse visit. Once done, pt will be able to take the form after vaccination received with the attached updated vaccination list. Thank you.

## 2025-02-17 NOTE — TELEPHONE ENCOUNTER
Spoke with: patient  Re:  MMR lab results/booster    Provider's message relayed in full detail.  Comments:    Pt stated she is not pregnant.

## 2025-02-18 ENCOUNTER — TELEPHONE (OUTPATIENT)
Age: 45
End: 2025-02-18

## 2025-02-18 NOTE — TELEPHONE ENCOUNTER
Pt canceled MMR appt scheduled for 2/18/25.  Pt needs MMR booster prior to returning her health form to her.  Health form in brown folder.

## 2025-02-27 ENCOUNTER — CLINICAL SUPPORT (OUTPATIENT)
Age: 45
End: 2025-02-27
Payer: COMMERCIAL

## 2025-02-27 DIAGNOSIS — Z23 ENCOUNTER FOR IMMUNIZATION: Primary | ICD-10-CM

## 2025-02-27 PROCEDURE — 90707 MMR VACCINE SC: CPT

## 2025-02-27 PROCEDURE — 90472 IMMUNIZATION ADMIN EACH ADD: CPT

## 2025-02-27 PROCEDURE — 90471 IMMUNIZATION ADMIN: CPT

## 2025-06-27 ENCOUNTER — OFFICE VISIT (OUTPATIENT)
Age: 45
End: 2025-06-27
Payer: COMMERCIAL

## 2025-06-27 ENCOUNTER — APPOINTMENT (OUTPATIENT)
Age: 45
End: 2025-06-27
Payer: COMMERCIAL

## 2025-06-27 VITALS
RESPIRATION RATE: 18 BRPM | DIASTOLIC BLOOD PRESSURE: 66 MMHG | HEIGHT: 65 IN | WEIGHT: 149.6 LBS | OXYGEN SATURATION: 99 % | SYSTOLIC BLOOD PRESSURE: 112 MMHG | BODY MASS INDEX: 24.93 KG/M2 | TEMPERATURE: 96.8 F | HEART RATE: 79 BPM

## 2025-06-27 DIAGNOSIS — R39.9 UTI SYMPTOMS: Primary | ICD-10-CM

## 2025-06-27 DIAGNOSIS — R39.9 UTI SYMPTOMS: ICD-10-CM

## 2025-06-27 LAB
AMORPH URATE CRY URNS QL MICRO: ABNORMAL
BACTERIA UR QL AUTO: ABNORMAL /HPF
BILIRUB UR QL STRIP: NEGATIVE
CAOX CRY URNS QL MICRO: ABNORMAL /HPF
CLARITY UR: ABNORMAL
COLOR UR: YELLOW
GLUCOSE UR STRIP-MCNC: NEGATIVE MG/DL
HGB UR QL STRIP.AUTO: ABNORMAL
KETONES UR STRIP-MCNC: NEGATIVE MG/DL
LEUKOCYTE ESTERASE UR QL STRIP: ABNORMAL
NITRITE UR QL STRIP: NEGATIVE
NON-SQ EPI CELLS URNS QL MICRO: ABNORMAL /HPF
PH UR STRIP.AUTO: 6 [PH]
PROT UR STRIP-MCNC: ABNORMAL MG/DL
RBC #/AREA URNS AUTO: ABNORMAL /HPF
SP GR UR STRIP.AUTO: 1.03 (ref 1–1.03)
UROBILINOGEN UR STRIP-ACNC: <2 MG/DL
WBC #/AREA URNS AUTO: ABNORMAL /HPF

## 2025-06-27 PROCEDURE — 81001 URINALYSIS AUTO W/SCOPE: CPT

## 2025-06-27 PROCEDURE — 99214 OFFICE O/P EST MOD 30 MIN: CPT | Performed by: INTERNAL MEDICINE

## 2025-06-27 PROCEDURE — 87086 URINE CULTURE/COLONY COUNT: CPT

## 2025-06-27 RX ORDER — CEPHALEXIN 500 MG/1
500 CAPSULE ORAL EVERY 12 HOURS SCHEDULED
Qty: 14 CAPSULE | Refills: 0 | Status: SHIPPED | OUTPATIENT
Start: 2025-06-27 | End: 2025-07-04

## 2025-06-27 RX ORDER — ONDANSETRON 4 MG/1
TABLET, ORALLY DISINTEGRATING ORAL
COMMUNITY
Start: 2025-04-08

## 2025-06-27 NOTE — PROGRESS NOTES
"Name: Deisi Rivera      : 1980      MRN: [id number]  Encounter Provider: Quan Gordon DO  Encounter Date: 2025   Encounter department: [facility]    :  Assessment & Plan  UTI symptoms    Check UA with reflex to culture. She is having typical UTI symptoms and will empirically treat her with cephalexin. Stay well hydrated.    Orders:  •  UA w Reflex to Microscopic w Reflex to Culture; Future  •  cephalexin (KEFLEX) 500 mg capsule; Take 1 capsule (500 mg total) by mouth every 12 (twelve) hours for 7 days         History of Present Illness     History of Present Illness  The patient presents for evaluation of urinary tract infection.    She reports experiencing difficulty with urination, characterized by a frequent urge to use the restroom and associated discomfort. These symptoms began on Monday, initially mild but have since escalated in severity. She has a history of recurrent urinary tract infections throughout her life.     Review of Systems - see HPI    Objective   /66 (BP Location: Left arm, Patient Position: Sitting, Cuff Size: Standard)   Pulse 79   Temp (!) 96.8 °F (36 °C) (Tympanic)   Resp 18   Ht 5' 5\" (1.651 m)   Wt 67.9 kg (149 lb 9.6 oz) Comment: WITH SHOES ON  SpO2 99%   BMI 24.89 kg/m²     Physical Exam  Constitutional:       General: She is not in acute distress.     Appearance: She is not ill-appearing.     Cardiovascular:      Rate and Rhythm: Normal rate and regular rhythm.      Heart sounds: No murmur heard.  Pulmonary:      Effort: Pulmonary effort is normal. No respiratory distress.      Breath sounds: No wheezing.   Abdominal:      General: Abdomen is flat. There is no distension.      Palpations: Abdomen is soft.      Tenderness: There is no abdominal tenderness. There is no right CVA tenderness or left CVA tenderness.     Neurological:      Mental Status: She is alert.         "

## 2025-06-29 LAB — BACTERIA UR CULT: NORMAL
